# Patient Record
Sex: FEMALE | Race: WHITE | HISPANIC OR LATINO | Employment: STUDENT | URBAN - METROPOLITAN AREA
[De-identification: names, ages, dates, MRNs, and addresses within clinical notes are randomized per-mention and may not be internally consistent; named-entity substitution may affect disease eponyms.]

---

## 2019-02-22 ENCOUNTER — ANESTHESIA EVENT (OUTPATIENT)
Dept: SURGERY | Facility: HOSPITAL | Age: 19
End: 2019-02-22

## 2019-02-22 ENCOUNTER — HOSPITAL ENCOUNTER (OUTPATIENT)
Facility: HOSPITAL | Age: 19
LOS: 1 days | Discharge: HOME | End: 2019-02-24
Attending: EMERGENCY MEDICINE | Admitting: SURGERY

## 2019-02-22 DIAGNOSIS — K35.30 ACUTE APPENDICITIS WITH LOCALIZED PERITONITIS, UNSPECIFIED WHETHER ABSCESS PRESENT, UNSPECIFIED WHETHER GANGRENE PRESENT, UNSPECIFIED WHETHER PERFORATION PRESENT: Primary | ICD-10-CM

## 2019-02-22 LAB
ALBUMIN SERPL BCP-MCNC: 4.2 G/DL
ALP SERPL-CCNC: 51 U/L
ALT SERPL W/O P-5'-P-CCNC: 13 U/L
ANION GAP SERPL CALC-SCNC: 7 MMOL/L
AST SERPL-CCNC: 16 U/L
B-HCG UR QL: NEGATIVE
BASOPHILS # BLD AUTO: 0.02 K/UL
BASOPHILS NFR BLD: 0.1 %
BILIRUB SERPL-MCNC: 1 MG/DL
BILIRUB UR QL STRIP: NEGATIVE
BUN SERPL-MCNC: 6 MG/DL
CALCIUM SERPL-MCNC: 9.6 MG/DL
CHLORIDE SERPL-SCNC: 104 MMOL/L
CLARITY UR: CLEAR
CO2 SERPL-SCNC: 27 MMOL/L
COLOR UR: YELLOW
CREAT SERPL-MCNC: 0.7 MG/DL
CTP QC/QA: YES
DIFFERENTIAL METHOD: ABNORMAL
EOSINOPHIL # BLD AUTO: 0 K/UL
EOSINOPHIL NFR BLD: 0.2 %
ERYTHROCYTE [DISTWIDTH] IN BLOOD BY AUTOMATED COUNT: 12.4 %
EST. GFR  (AFRICAN AMERICAN): >60 ML/MIN/1.73 M^2
EST. GFR  (NON AFRICAN AMERICAN): >60 ML/MIN/1.73 M^2
GLUCOSE SERPL-MCNC: 88 MG/DL
GLUCOSE UR QL STRIP: NEGATIVE
HCT VFR BLD AUTO: 43.2 %
HGB BLD-MCNC: 14.4 G/DL
HGB UR QL STRIP: ABNORMAL
KETONES UR QL STRIP: NEGATIVE
LEUKOCYTE ESTERASE UR QL STRIP: ABNORMAL
LIPASE SERPL-CCNC: 11 U/L
LYMPHOCYTES # BLD AUTO: 2.5 K/UL
LYMPHOCYTES NFR BLD: 14.7 %
MCH RBC QN AUTO: 29.4 PG
MCHC RBC AUTO-ENTMCNC: 33.3 G/DL
MCV RBC AUTO: 88 FL
MICROSCOPIC COMMENT: NORMAL
MONOCYTES # BLD AUTO: 1.2 K/UL
MONOCYTES NFR BLD: 7 %
NEUTROPHILS # BLD AUTO: 13.4 K/UL
NEUTROPHILS NFR BLD: 77.7 %
NITRITE UR QL STRIP: NEGATIVE
PH UR STRIP: 7 [PH] (ref 5–8)
PLATELET # BLD AUTO: 314 K/UL
PMV BLD AUTO: 9.8 FL
POTASSIUM SERPL-SCNC: 3.2 MMOL/L
PROT SERPL-MCNC: 7.3 G/DL
PROT UR QL STRIP: NEGATIVE
RBC # BLD AUTO: 4.9 M/UL
RBC #/AREA URNS HPF: 2 /HPF (ref 0–4)
SODIUM SERPL-SCNC: 138 MMOL/L
SP GR UR STRIP: 1.01 (ref 1–1.03)
URN SPEC COLLECT METH UR: ABNORMAL
UROBILINOGEN UR STRIP-ACNC: NEGATIVE EU/DL
WBC # BLD AUTO: 17.22 K/UL
WBC #/AREA URNS HPF: 1 /HPF (ref 0–5)

## 2019-02-22 PROCEDURE — 25000003 PHARM REV CODE 250: Performed by: STUDENT IN AN ORGANIZED HEALTH CARE EDUCATION/TRAINING PROGRAM

## 2019-02-22 PROCEDURE — 99285 EMERGENCY DEPT VISIT HI MDM: CPT | Mod: 25

## 2019-02-22 PROCEDURE — 25500020 PHARM REV CODE 255: Performed by: EMERGENCY MEDICINE

## 2019-02-22 PROCEDURE — 81000 URINALYSIS NONAUTO W/SCOPE: CPT

## 2019-02-22 PROCEDURE — 63600175 PHARM REV CODE 636 W HCPCS: Performed by: EMERGENCY MEDICINE

## 2019-02-22 PROCEDURE — 96372 THER/PROPH/DIAG INJ SC/IM: CPT | Mod: 59

## 2019-02-22 PROCEDURE — 81025 URINE PREGNANCY TEST: CPT | Performed by: NURSE PRACTITIONER

## 2019-02-22 PROCEDURE — 96375 TX/PRO/DX INJ NEW DRUG ADDON: CPT

## 2019-02-22 PROCEDURE — G0378 HOSPITAL OBSERVATION PER HR: HCPCS

## 2019-02-22 PROCEDURE — 96376 TX/PRO/DX INJ SAME DRUG ADON: CPT

## 2019-02-22 PROCEDURE — 96365 THER/PROPH/DIAG IV INF INIT: CPT

## 2019-02-22 PROCEDURE — 80053 COMPREHEN METABOLIC PANEL: CPT

## 2019-02-22 PROCEDURE — 85025 COMPLETE CBC W/AUTO DIFF WBC: CPT

## 2019-02-22 PROCEDURE — 83690 ASSAY OF LIPASE: CPT

## 2019-02-22 PROCEDURE — 63600175 PHARM REV CODE 636 W HCPCS: Performed by: STUDENT IN AN ORGANIZED HEALTH CARE EDUCATION/TRAINING PROGRAM

## 2019-02-22 RX ORDER — ACETAMINOPHEN 325 MG/1
650 TABLET ORAL EVERY 8 HOURS PRN
Status: DISCONTINUED | OUTPATIENT
Start: 2019-02-22 | End: 2019-02-24 | Stop reason: HOSPADM

## 2019-02-22 RX ORDER — ENOXAPARIN SODIUM 100 MG/ML
40 INJECTION SUBCUTANEOUS EVERY 24 HOURS
Status: DISCONTINUED | OUTPATIENT
Start: 2019-02-22 | End: 2019-02-24 | Stop reason: HOSPADM

## 2019-02-22 RX ORDER — DEXTROSE MONOHYDRATE, SODIUM CHLORIDE, AND POTASSIUM CHLORIDE 50; 1.49; 4.5 G/1000ML; G/1000ML; G/1000ML
INJECTION, SOLUTION INTRAVENOUS CONTINUOUS
Status: DISCONTINUED | OUTPATIENT
Start: 2019-02-22 | End: 2019-02-24 | Stop reason: HOSPADM

## 2019-02-22 RX ORDER — DIPHENHYDRAMINE HYDROCHLORIDE 50 MG/ML
25 INJECTION INTRAMUSCULAR; INTRAVENOUS EVERY 4 HOURS PRN
Status: DISCONTINUED | OUTPATIENT
Start: 2019-02-22 | End: 2019-02-24 | Stop reason: HOSPADM

## 2019-02-22 RX ORDER — KETOROLAC TROMETHAMINE 30 MG/ML
15 INJECTION, SOLUTION INTRAMUSCULAR; INTRAVENOUS
Status: COMPLETED | OUTPATIENT
Start: 2019-02-22 | End: 2019-02-22

## 2019-02-22 RX ORDER — SODIUM CHLORIDE 0.9 % (FLUSH) 0.9 %
3 SYRINGE (ML) INJECTION
Status: DISCONTINUED | OUTPATIENT
Start: 2019-02-22 | End: 2019-02-24 | Stop reason: HOSPADM

## 2019-02-22 RX ORDER — NORETHINDRONE ACETATE AND ETHINYL ESTRADIOL AND FERROUS FUMARATE 5-7-9-7
KIT ORAL DAILY
COMMUNITY
End: 2023-10-24

## 2019-02-22 RX ORDER — ONDANSETRON 8 MG/1
8 TABLET, ORALLY DISINTEGRATING ORAL EVERY 8 HOURS PRN
Status: DISCONTINUED | OUTPATIENT
Start: 2019-02-22 | End: 2019-02-24 | Stop reason: HOSPADM

## 2019-02-22 RX ORDER — ONDANSETRON 2 MG/ML
4 INJECTION INTRAMUSCULAR; INTRAVENOUS
Status: COMPLETED | OUTPATIENT
Start: 2019-02-22 | End: 2019-02-22

## 2019-02-22 RX ORDER — MORPHINE SULFATE 2 MG/ML
3 INJECTION, SOLUTION INTRAMUSCULAR; INTRAVENOUS EVERY 4 HOURS PRN
Status: DISCONTINUED | OUTPATIENT
Start: 2019-02-22 | End: 2019-02-24 | Stop reason: HOSPADM

## 2019-02-22 RX ORDER — LIDOCAINE HYDROCHLORIDE 10 MG/ML
1 INJECTION INFILTRATION; PERINEURAL ONCE
Status: DISCONTINUED | OUTPATIENT
Start: 2019-02-22 | End: 2019-02-24 | Stop reason: HOSPADM

## 2019-02-22 RX ORDER — RAMELTEON 8 MG/1
8 TABLET ORAL NIGHTLY PRN
Status: DISCONTINUED | OUTPATIENT
Start: 2019-02-22 | End: 2019-02-24 | Stop reason: HOSPADM

## 2019-02-22 RX ORDER — METOCLOPRAMIDE HYDROCHLORIDE 5 MG/ML
10 INJECTION INTRAMUSCULAR; INTRAVENOUS
Status: DISCONTINUED | OUTPATIENT
Start: 2019-02-22 | End: 2019-02-22

## 2019-02-22 RX ORDER — POTASSIUM CHLORIDE 20 MEQ/1
40 TABLET, EXTENDED RELEASE ORAL ONCE
Status: COMPLETED | OUTPATIENT
Start: 2019-02-22 | End: 2019-02-22

## 2019-02-22 RX ORDER — NAPROXEN 250 MG/1
250 TABLET ORAL
COMMUNITY
End: 2023-10-24

## 2019-02-22 RX ORDER — METOCLOPRAMIDE HYDROCHLORIDE 5 MG/ML
10 INJECTION INTRAMUSCULAR; INTRAVENOUS
Status: COMPLETED | OUTPATIENT
Start: 2019-02-22 | End: 2019-02-22

## 2019-02-22 RX ORDER — KETOROLAC TROMETHAMINE 30 MG/ML
15 INJECTION, SOLUTION INTRAMUSCULAR; INTRAVENOUS EVERY 6 HOURS
Status: DISCONTINUED | OUTPATIENT
Start: 2019-02-23 | End: 2019-02-24 | Stop reason: HOSPADM

## 2019-02-22 RX ADMIN — KETOROLAC TROMETHAMINE 15 MG: 30 INJECTION, SOLUTION INTRAMUSCULAR at 02:02

## 2019-02-22 RX ADMIN — METOCLOPRAMIDE 10 MG: 5 INJECTION, SOLUTION INTRAMUSCULAR; INTRAVENOUS at 08:02

## 2019-02-22 RX ADMIN — ONDANSETRON 4 MG: 2 INJECTION INTRAMUSCULAR; INTRAVENOUS at 02:02

## 2019-02-22 RX ADMIN — KETOROLAC TROMETHAMINE 15 MG: 30 INJECTION, SOLUTION INTRAMUSCULAR at 11:02

## 2019-02-22 RX ADMIN — POTASSIUM CHLORIDE 40 MEQ: 20 TABLET, EXTENDED RELEASE ORAL at 07:02

## 2019-02-22 RX ADMIN — PIPERACILLIN AND TAZOBACTAM 4.5 G: 4; .5 INJECTION, POWDER, LYOPHILIZED, FOR SOLUTION INTRAVENOUS; PARENTERAL at 07:02

## 2019-02-22 RX ADMIN — DEXTROSE, SODIUM CHLORIDE, AND POTASSIUM CHLORIDE: 5; .45; .15 INJECTION INTRAVENOUS at 11:02

## 2019-02-22 RX ADMIN — ENOXAPARIN SODIUM 40 MG: 100 INJECTION SUBCUTANEOUS at 07:02

## 2019-02-22 RX ADMIN — IOHEXOL 75 ML: 350 INJECTION, SOLUTION INTRAVENOUS at 04:02

## 2019-02-22 NOTE — ED NOTES
Consuelo  number [id number] used to interpret and assess patient at this time. Patient  States she has been having RLQ pain and nausea x2 days. Patient states her Last menstrual period was December 30th; she has had some brown spotting but no cycle. States she took a pregnancy test at home however it was negative. She is also complains on nausea with no vomiting and a headache that feels like pressure.

## 2019-02-22 NOTE — ED PROVIDER NOTES
Encounter Date: 2/22/2019    SCRIBE #1 NOTE: I, Mell Tarango, am scribing for, and in the presence of,  Dr. Lefort. I have scribed the entire note.       History     Chief Complaint   Patient presents with    Abdominal Pain     right abdominal pain with nausea for two days.  No dysuria, no vaginal discharge.  Late with menstrual and took 2 pregnancy tests and they were negative.     This is a 18 y.o. female with no significant PMHx who presents to the Emergency Department with a cc of worsening abdominal pain x 2 days. The pain is located to the lower abdomen, described as stabbing, and constant. Pt reports associated subjective fever, nausea, and vaginal bleeding only after sex. Pt denies vomiting, diarrhea, constipation, frequency, dysuria, or back pain. Her symptoms are worsened with movement and laughing and without alleviating factors. Patient reports a prior history of similar symptoms a couple weeks ago, albeit less severe. She states she saw her PCP a couple weeks ago and the US was normal at that time.         The history is provided by the patient. A  was used.     Review of patient's allergies indicates:  No Known Allergies  History reviewed. No pertinent past medical history.  History reviewed. No pertinent surgical history.  History reviewed. No pertinent family history.  Social History     Tobacco Use    Smoking status: Never Smoker   Substance Use Topics    Alcohol use: No     Frequency: Never    Drug use: No     Review of Systems   Constitutional: Positive for fever (subjective). Negative for chills and fatigue.   HENT: Negative for facial swelling, trouble swallowing and voice change.    Eyes: Negative for photophobia, pain and redness.   Respiratory: Negative for cough, choking and shortness of breath.    Cardiovascular: Negative for chest pain, palpitations and leg swelling.   Gastrointestinal: Positive for abdominal pain and nausea. Negative for constipation, diarrhea  and vomiting.   Genitourinary: Positive for vaginal bleeding. Negative for dysuria, frequency and urgency.   Musculoskeletal: Negative for back pain, neck pain and neck stiffness.   Neurological: Negative for seizures, speech difficulty, light-headedness, numbness and headaches.   All other systems reviewed and are negative.      Physical Exam     Initial Vitals [02/22/19 1322]   BP Pulse Resp Temp SpO2   120/75 89 15 99.1 °F (37.3 °C) 99 %      MAP       --         Physical Exam    Nursing note and vitals reviewed.  Constitutional: She appears well-developed and well-nourished. No distress.   HENT:   Head: Normocephalic and atraumatic.   Mouth/Throat: Oropharynx is clear and moist.   Eyes: Conjunctivae and EOM are normal. Pupils are equal, round, and reactive to light.   Neck: Normal range of motion. Neck supple.   Cardiovascular: Normal rate, regular rhythm and normal heart sounds. Exam reveals no gallop and no friction rub.    No murmur heard.  Pulmonary/Chest: Breath sounds normal. She has no wheezes. She has no rhonchi. She has no rales.   Abdominal: Soft. Bowel sounds are normal. She exhibits no distension. There is tenderness (severe) in the right lower quadrant. There is no guarding.   Musculoskeletal: Normal range of motion. She exhibits no edema or tenderness.   Neurological: She is alert and oriented to person, place, and time. She has normal strength. No cranial nerve deficit.   Skin: Skin is warm and dry. Capillary refill takes less than 2 seconds.   Psychiatric: She has a normal mood and affect. Her behavior is normal.         ED Course   Procedures  Labs Reviewed   URINALYSIS, REFLEX TO URINE CULTURE - Abnormal; Notable for the following components:       Result Value    Occult Blood UA 2+ (*)     Leukocytes, UA Trace (*)     All other components within normal limits    Narrative:     Preferred Collection Type->Urine, Clean Catch   CBC W/ AUTO DIFFERENTIAL - Abnormal; Notable for the following  components:    WBC 17.22 (*)     Gran # (ANC) 13.4 (*)     Mono # 1.2 (*)     Gran% 77.7 (*)     Lymph% 14.7 (*)     All other components within normal limits   COMPREHENSIVE METABOLIC PANEL - Abnormal; Notable for the following components:    Potassium 3.2 (*)     Alkaline Phosphatase 51 (*)     Anion Gap 7 (*)     All other components within normal limits   LIPASE   URINALYSIS MICROSCOPIC    Narrative:     Preferred Collection Type->Urine, Clean Catch   POCT URINE PREGNANCY          Imaging Results          CT Abdomen Pelvis With Contrast (Final result)  Result time 02/22/19 16:18:38    Final result by Darin Cunha DO (02/22/19 16:18:38)                 Impression:      Mild dilated thickened appendix with subtle periappendiceal fat stranding induration with small free fluid in the dependent pelvis concerning for acute appendicitis.  Clinical correlation and surgical consultation advised.    Mild hepatomegaly with slight decreased attenuation of the liver along the falciform ligament suggestive for focal fatty infiltration.    Please see above for additional details.      Electronically signed by: Darin Cunha DO  Date:    02/22/2019  Time:    16:18             Narrative:    EXAMINATION:  CT ABDOMEN PELVIS WITH CONTRAST    CLINICAL HISTORY:  RLQ pain, appendicitis suspected;Recent Pelvic US pt states nl;    TECHNIQUE:  Low dose axial images, sagittal and coronal reformations were obtained from the lung bases to the pubic symphysis following the IV administration of 75 mL of Omnipaque 350 .  Oral contrast was not given.    COMPARISON:  None.    FINDINGS:  No consolidation in the visualized lungs.  The liver is mildly enlarged measuring 18 cm in craniocaudal dimension.  There is slight decreased attenuation in the medial segment left lobe of the liver about the falciform ligament suggestive for focal fatty infiltration.  No calcified gallstones in the gallbladder by CT criteria.  The spleen and pancreas are  normal in size without focal lesions.  The adrenal glands are within normal limits.    Kidneys are normal in size with uptake of contrast without hydronephrosis.  No abdominal aortic aneurysm.    There is mild dilatation of the appendix with subtle periappendiceal fat stranding induration with appendix measuring approximately 1 cm in diameter concerning for early appendicitis.  There is a small amount of free fluid in the pelvis.  No evidence for free intraperitoneal gas.    No focal dilated loop of bowel.  No evidence for acute fracture or subluxation visualized spine                                 Medical Decision Making:   Differential Diagnosis:   Appy torsion cyst AGE ectopic  Clinical Tests:   Lab Tests: Ordered and Reviewed  Radiological Study: Ordered and Reviewed  ED Management:  Imaging concerning for acute appy, pain improved VSS discussed with Dr. Tony [facility] Sx for admission.  Discussed findings with patient including need for surgery to remove inflammed appendix, prevention of rupture and worsening of condition.                   ED Course as of Feb 22 1630 Fri Feb 22, 2019   1324 This is a SORT/MSE of a 18 y.o. female presenting to the ED with c/o lower abdominal pain x2 days with nausea.  Patient states that her menstrual cycle is late and has had 2-pregnancy test at home. Care will be transferred to an alternate provider when patient is roomed for a full evaluation and final disposition. JAMIE Lewis 1:25 PM   [CH]      ED Course User Index  [CH] Ricky Valdez NP     Clinical Impression:     1. Acute appendicitis with localized peritonitis, unspecified whether abscess present, unspecified whether gangrene present, unspecified whether perforation present            Disposition:   Disposition: Admitted  Condition: Stable    Scribe attestation:  I, Dr. Guy Lefort, personally performed the services described in this documentation. All medical record entries made by the scribe were at my  direction and in my presence. I have reviewed the chart and agree that the record reflects my personal performance and is accurate and complete. Guy Lefort, MD.  6:33 PM 02/22/2019                      Guy J. Lefort, MD  02/22/19 8488

## 2019-02-23 ENCOUNTER — ANESTHESIA (OUTPATIENT)
Dept: SURGERY | Facility: HOSPITAL | Age: 19
End: 2019-02-23

## 2019-02-23 LAB
ALBUMIN SERPL BCP-MCNC: 3.3 G/DL
ALP SERPL-CCNC: 40 U/L
ALT SERPL W/O P-5'-P-CCNC: 8 U/L
ANION GAP SERPL CALC-SCNC: 5 MMOL/L
AST SERPL-CCNC: 13 U/L
BASOPHILS # BLD AUTO: 0.02 K/UL
BASOPHILS NFR BLD: 0.3 %
BILIRUB SERPL-MCNC: 0.7 MG/DL
BUN SERPL-MCNC: 9 MG/DL
CALCIUM SERPL-MCNC: 8.8 MG/DL
CHLORIDE SERPL-SCNC: 107 MMOL/L
CO2 SERPL-SCNC: 26 MMOL/L
CREAT SERPL-MCNC: 0.7 MG/DL
DIFFERENTIAL METHOD: NORMAL
EOSINOPHIL # BLD AUTO: 0.1 K/UL
EOSINOPHIL NFR BLD: 2 %
ERYTHROCYTE [DISTWIDTH] IN BLOOD BY AUTOMATED COUNT: 12.5 %
EST. GFR  (AFRICAN AMERICAN): >60 ML/MIN/1.73 M^2
EST. GFR  (NON AFRICAN AMERICAN): >60 ML/MIN/1.73 M^2
GLUCOSE SERPL-MCNC: 113 MG/DL
HCT VFR BLD AUTO: 40 %
HGB BLD-MCNC: 13 G/DL
LYMPHOCYTES # BLD AUTO: 2.6 K/UL
LYMPHOCYTES NFR BLD: 42.5 %
MCH RBC QN AUTO: 28.7 PG
MCHC RBC AUTO-ENTMCNC: 32.5 G/DL
MCV RBC AUTO: 88 FL
MONOCYTES # BLD AUTO: 0.7 K/UL
MONOCYTES NFR BLD: 11.1 %
NEUTROPHILS # BLD AUTO: 2.7 K/UL
NEUTROPHILS NFR BLD: 43.9 %
PLATELET # BLD AUTO: 274 K/UL
PMV BLD AUTO: 9.5 FL
POTASSIUM SERPL-SCNC: 3.8 MMOL/L
PROT SERPL-MCNC: 6.1 G/DL
RBC # BLD AUTO: 4.53 M/UL
SODIUM SERPL-SCNC: 138 MMOL/L
WBC # BLD AUTO: 6.1 K/UL

## 2019-02-23 PROCEDURE — 36000708 HC OR TIME LEV III 1ST 15 MIN: Performed by: SURGERY

## 2019-02-23 PROCEDURE — 71000033 HC RECOVERY, INTIAL HOUR: Performed by: SURGERY

## 2019-02-23 PROCEDURE — 25000003 PHARM REV CODE 250: Performed by: SURGERY

## 2019-02-23 PROCEDURE — C9290 INJ, BUPIVACAINE LIPOSOME: HCPCS | Performed by: SURGERY

## 2019-02-23 PROCEDURE — 88304 TISSUE EXAM BY PATHOLOGIST: CPT | Performed by: PATHOLOGY

## 2019-02-23 PROCEDURE — 63600175 PHARM REV CODE 636 W HCPCS: Performed by: STUDENT IN AN ORGANIZED HEALTH CARE EDUCATION/TRAINING PROGRAM

## 2019-02-23 PROCEDURE — 88304 TISSUE SPECIMEN TO PATHOLOGY - SURGERY: ICD-10-PCS | Mod: 26,,, | Performed by: PATHOLOGY

## 2019-02-23 PROCEDURE — 37000009 HC ANESTHESIA EA ADD 15 MINS: Performed by: SURGERY

## 2019-02-23 PROCEDURE — 94761 N-INVAS EAR/PLS OXIMETRY MLT: CPT

## 2019-02-23 PROCEDURE — 25000003 PHARM REV CODE 250: Performed by: STUDENT IN AN ORGANIZED HEALTH CARE EDUCATION/TRAINING PROGRAM

## 2019-02-23 PROCEDURE — 85025 COMPLETE CBC W/AUTO DIFF WBC: CPT

## 2019-02-23 PROCEDURE — 27201423 OPTIME MED/SURG SUP & DEVICES STERILE SUPPLY: Performed by: SURGERY

## 2019-02-23 PROCEDURE — 37000008 HC ANESTHESIA 1ST 15 MINUTES: Performed by: SURGERY

## 2019-02-23 PROCEDURE — 96361 HYDRATE IV INFUSION ADD-ON: CPT

## 2019-02-23 PROCEDURE — 80053 COMPREHEN METABOLIC PANEL: CPT

## 2019-02-23 PROCEDURE — G0378 HOSPITAL OBSERVATION PER HR: HCPCS

## 2019-02-23 PROCEDURE — 36000709 HC OR TIME LEV III EA ADD 15 MIN: Performed by: SURGERY

## 2019-02-23 PROCEDURE — 63600175 PHARM REV CODE 636 W HCPCS: Performed by: SURGERY

## 2019-02-23 PROCEDURE — 36415 COLL VENOUS BLD VENIPUNCTURE: CPT

## 2019-02-23 PROCEDURE — 88304 TISSUE EXAM BY PATHOLOGIST: CPT | Mod: 26,,, | Performed by: PATHOLOGY

## 2019-02-23 RX ORDER — KETOROLAC TROMETHAMINE 30 MG/ML
INJECTION, SOLUTION INTRAMUSCULAR; INTRAVENOUS
Status: DISCONTINUED | OUTPATIENT
Start: 2019-02-23 | End: 2019-02-23

## 2019-02-23 RX ORDER — BACITRACIN 50000 [IU]/1
INJECTION, POWDER, FOR SOLUTION INTRAMUSCULAR
Status: DISCONTINUED | OUTPATIENT
Start: 2019-02-23 | End: 2019-02-23 | Stop reason: HOSPADM

## 2019-02-23 RX ORDER — NEOSTIGMINE METHYLSULFATE 1 MG/ML
INJECTION, SOLUTION INTRAVENOUS
Status: DISCONTINUED | OUTPATIENT
Start: 2019-02-23 | End: 2019-02-23

## 2019-02-23 RX ORDER — ONDANSETRON 2 MG/ML
INJECTION INTRAMUSCULAR; INTRAVENOUS
Status: DISCONTINUED | OUTPATIENT
Start: 2019-02-23 | End: 2019-02-23

## 2019-02-23 RX ORDER — ROCURONIUM BROMIDE 10 MG/ML
INJECTION, SOLUTION INTRAVENOUS
Status: DISCONTINUED | OUTPATIENT
Start: 2019-02-23 | End: 2019-02-23

## 2019-02-23 RX ORDER — PROPOFOL 10 MG/ML
VIAL (ML) INTRAVENOUS
Status: DISCONTINUED | OUTPATIENT
Start: 2019-02-23 | End: 2019-02-23

## 2019-02-23 RX ORDER — FENTANYL CITRATE 50 UG/ML
INJECTION, SOLUTION INTRAMUSCULAR; INTRAVENOUS
Status: DISCONTINUED | OUTPATIENT
Start: 2019-02-23 | End: 2019-02-23

## 2019-02-23 RX ORDER — MIDAZOLAM HYDROCHLORIDE 1 MG/ML
INJECTION, SOLUTION INTRAMUSCULAR; INTRAVENOUS
Status: DISCONTINUED | OUTPATIENT
Start: 2019-02-23 | End: 2019-02-23

## 2019-02-23 RX ORDER — LIDOCAINE HCL/PF 100 MG/5ML
SYRINGE (ML) INTRAVENOUS
Status: DISCONTINUED | OUTPATIENT
Start: 2019-02-23 | End: 2019-02-23

## 2019-02-23 RX ORDER — SODIUM CHLORIDE, SODIUM LACTATE, POTASSIUM CHLORIDE, CALCIUM CHLORIDE 600; 310; 30; 20 MG/100ML; MG/100ML; MG/100ML; MG/100ML
INJECTION, SOLUTION INTRAVENOUS CONTINUOUS PRN
Status: DISCONTINUED | OUTPATIENT
Start: 2019-02-23 | End: 2019-02-23

## 2019-02-23 RX ORDER — SODIUM CHLORIDE 0.9 % (FLUSH) 0.9 %
3 SYRINGE (ML) INJECTION
Status: DISCONTINUED | OUTPATIENT
Start: 2019-02-23 | End: 2019-02-24 | Stop reason: HOSPADM

## 2019-02-23 RX ORDER — DEXAMETHASONE SODIUM PHOSPHATE 4 MG/ML
INJECTION, SOLUTION INTRA-ARTICULAR; INTRALESIONAL; INTRAMUSCULAR; INTRAVENOUS; SOFT TISSUE
Status: DISCONTINUED | OUTPATIENT
Start: 2019-02-23 | End: 2019-02-23

## 2019-02-23 RX ORDER — EPHEDRINE SULFATE 50 MG/ML
INJECTION, SOLUTION INTRAVENOUS
Status: DISCONTINUED | OUTPATIENT
Start: 2019-02-23 | End: 2019-02-23

## 2019-02-23 RX ORDER — ONDANSETRON 2 MG/ML
4 INJECTION INTRAMUSCULAR; INTRAVENOUS DAILY PRN
Status: DISCONTINUED | OUTPATIENT
Start: 2019-02-23 | End: 2019-02-24 | Stop reason: HOSPADM

## 2019-02-23 RX ORDER — GLYCOPYRROLATE 0.2 MG/ML
INJECTION INTRAMUSCULAR; INTRAVENOUS
Status: DISCONTINUED | OUTPATIENT
Start: 2019-02-23 | End: 2019-02-23

## 2019-02-23 RX ORDER — ACETAMINOPHEN 10 MG/ML
INJECTION, SOLUTION INTRAVENOUS
Status: DISCONTINUED | OUTPATIENT
Start: 2019-02-23 | End: 2019-02-23

## 2019-02-23 RX ORDER — HYDROMORPHONE HYDROCHLORIDE 2 MG/ML
0.5 INJECTION, SOLUTION INTRAMUSCULAR; INTRAVENOUS; SUBCUTANEOUS EVERY 5 MIN PRN
Status: DISCONTINUED | OUTPATIENT
Start: 2019-02-23 | End: 2019-02-24 | Stop reason: HOSPADM

## 2019-02-23 RX ORDER — BUPIVACAINE HYDROCHLORIDE 2.5 MG/ML
INJECTION, SOLUTION EPIDURAL; INFILTRATION; INTRACAUDAL
Status: DISCONTINUED | OUTPATIENT
Start: 2019-02-23 | End: 2019-02-23 | Stop reason: HOSPADM

## 2019-02-23 RX ORDER — SUCCINYLCHOLINE CHLORIDE 20 MG/ML
INJECTION INTRAMUSCULAR; INTRAVENOUS
Status: DISCONTINUED | OUTPATIENT
Start: 2019-02-23 | End: 2019-02-23

## 2019-02-23 RX ADMIN — KETOROLAC TROMETHAMINE 30 MG: 30 INJECTION, SOLUTION INTRAMUSCULAR; INTRAVENOUS at 01:02

## 2019-02-23 RX ADMIN — FENTANYL CITRATE 50 MCG: 50 INJECTION, SOLUTION INTRAMUSCULAR; INTRAVENOUS at 12:02

## 2019-02-23 RX ADMIN — PIPERACILLIN AND TAZOBACTAM 4.5 G: 4; .5 INJECTION, POWDER, LYOPHILIZED, FOR SOLUTION INTRAVENOUS; PARENTERAL at 03:02

## 2019-02-23 RX ADMIN — EPHEDRINE SULFATE 10 MG: 50 INJECTION, SOLUTION INTRAMUSCULAR; INTRAVENOUS; SUBCUTANEOUS at 12:02

## 2019-02-23 RX ADMIN — MIDAZOLAM 2 MG: 1 INJECTION INTRAMUSCULAR; INTRAVENOUS at 12:02

## 2019-02-23 RX ADMIN — FENTANYL CITRATE 100 MCG: 50 INJECTION, SOLUTION INTRAMUSCULAR; INTRAVENOUS at 12:02

## 2019-02-23 RX ADMIN — ONDANSETRON 8 MG: 8 TABLET, ORALLY DISINTEGRATING ORAL at 05:02

## 2019-02-23 RX ADMIN — FENTANYL CITRATE 50 MCG: 50 INJECTION, SOLUTION INTRAMUSCULAR; INTRAVENOUS at 01:02

## 2019-02-23 RX ADMIN — HYDROMORPHONE HYDROCHLORIDE 0.5 MG: 2 INJECTION, SOLUTION INTRAMUSCULAR; INTRAVENOUS; SUBCUTANEOUS at 02:02

## 2019-02-23 RX ADMIN — DEXTROSE, SODIUM CHLORIDE, AND POTASSIUM CHLORIDE: 5; .45; .15 INJECTION INTRAVENOUS at 09:02

## 2019-02-23 RX ADMIN — EPHEDRINE SULFATE 10 MG: 50 INJECTION, SOLUTION INTRAMUSCULAR; INTRAVENOUS; SUBCUTANEOUS at 01:02

## 2019-02-23 RX ADMIN — PIPERACILLIN SODIUM AND TAZOBACTAM SODIUM 4.5 G: 2; .25 INJECTION, POWDER, LYOPHILIZED, FOR SOLUTION INTRAVENOUS at 01:02

## 2019-02-23 RX ADMIN — MORPHINE SULFATE 3 MG: 2 INJECTION, SOLUTION INTRAMUSCULAR; INTRAVENOUS at 08:02

## 2019-02-23 RX ADMIN — ROCURONIUM BROMIDE 5 MG: 10 INJECTION, SOLUTION INTRAVENOUS at 12:02

## 2019-02-23 RX ADMIN — PIPERACILLIN AND TAZOBACTAM 4.5 G: 4; .5 INJECTION, POWDER, LYOPHILIZED, FOR SOLUTION INTRAVENOUS; PARENTERAL at 08:02

## 2019-02-23 RX ADMIN — SUCCINYLCHOLINE CHLORIDE 100 MG: 20 INJECTION, SOLUTION INTRAMUSCULAR; INTRAVENOUS at 12:02

## 2019-02-23 RX ADMIN — MIDAZOLAM 1 MG: 1 INJECTION INTRAMUSCULAR; INTRAVENOUS at 01:02

## 2019-02-23 RX ADMIN — ROCURONIUM BROMIDE 10 MG: 10 INJECTION, SOLUTION INTRAVENOUS at 01:02

## 2019-02-23 RX ADMIN — PROPOFOL 150 MG: 10 INJECTION, EMULSION INTRAVENOUS at 12:02

## 2019-02-23 RX ADMIN — LIDOCAINE HYDROCHLORIDE 75 MG: 20 INJECTION, SOLUTION INTRAVENOUS at 12:02

## 2019-02-23 RX ADMIN — PIPERACILLIN AND TAZOBACTAM 4.5 G: 4; .5 INJECTION, POWDER, LYOPHILIZED, FOR SOLUTION INTRAVENOUS; PARENTERAL at 11:02

## 2019-02-23 RX ADMIN — ROCURONIUM BROMIDE 15 MG: 10 INJECTION, SOLUTION INTRAVENOUS at 12:02

## 2019-02-23 RX ADMIN — ONDANSETRON 4 MG: 2 INJECTION, SOLUTION INTRAMUSCULAR; INTRAVENOUS at 01:02

## 2019-02-23 RX ADMIN — DEXAMETHASONE SODIUM PHOSPHATE 8 MG: 4 INJECTION, SOLUTION INTRAMUSCULAR; INTRAVENOUS at 12:02

## 2019-02-23 RX ADMIN — NEOSTIGMINE METHYLSULFATE 3 MG: 1 INJECTION INTRAVENOUS at 01:02

## 2019-02-23 RX ADMIN — SODIUM CHLORIDE, SODIUM LACTATE, POTASSIUM CHLORIDE, AND CALCIUM CHLORIDE: .6; .31; .03; .02 INJECTION, SOLUTION INTRAVENOUS at 12:02

## 2019-02-23 RX ADMIN — ACETAMINOPHEN 1000 MG: 10 INJECTION, SOLUTION INTRAVENOUS at 12:02

## 2019-02-23 RX ADMIN — GLYCOPYRROLATE 0.5 MG: 0.2 INJECTION, SOLUTION INTRAMUSCULAR; INTRAVENOUS at 01:02

## 2019-02-23 NOTE — PROGRESS NOTES
"General Surgery Service  H&P    Chief Complaint: RLQ Abdominal pain    HPI: Patient is a 18 year old female with 2 days of abdominal pain. She started having nausea but no vomiting. Patient states she had a fever last night but otherwise felt okay. She denies any chills.   Complains of abdominal pain that localized to RLQ that has become progressively more localized to the RLQ. No history of previous surgery and no other medical conditions.     Interval Hx: Pain improved but still focally tender to RLQ. NAEO    Vitals:  Vitals:    02/22/19 2050 02/22/19 2345 02/23/19 0011 02/23/19 0534   BP: 97/63  107/65 96/60   BP Location: Left arm  Left arm Left arm   Patient Position: Lying  Lying Lying   Pulse: 91  85 77   Resp: 16  14 16   Temp: 98.5 °F (36.9 °C)  98.4 °F (36.9 °C) 98.8 °F (37.1 °C)   TempSrc: Oral  Oral Oral   SpO2: 97% 100% 100% 100%   Weight: 57.6 kg (126 lb 15.8 oz)   56.2 kg (123 lb 14.4 oz)   Height: 5' 2" (1.575 m)        Body mass index is 22.66 kg/m².I/O last 3 completed shifts:  In: 803.3 [I.V.:603.3; IV Piggyback:200]  Out: 150 [Urine:150]  I/O this shift:  In: -   Out: 300 [Urine:300]      Intake/Output Summary (Last 24 hours) at 2/23/2019 0847  Last data filed at 2/23/2019 0800  Gross per 24 hour   Intake 803.33 ml   Output 450 ml   Net 353.33 ml       Physical Exam:  Gen - No acute distress, awake/alert/oriented  HEENT - Normocephalic, atraumatic, No NGT  CV - Regular rate and rhythm  Resp - normal work of breathing  Abd - Soft, TTP in RLQ improved, not distended, no surgical scars, negative rebound tenderness, no guarding  Ext - Warm and well perfused  G/U - No sheth catheter in place  Vasc - palpable distal pulses BUE      Labs:    Recent Labs   Lab 02/23/19  0528   WBC 6.10   RBC 4.53   HGB 13.0   HCT 40.0      MCV 88   MCH 28.7   MCHC 32.5     Recent Labs   Lab 02/23/19 0528      K 3.8      CO2 26   BUN 9   CREATININE 0.7     Recent Labs   Lab 02/23/19 0528   CALCIUM 8.8 "   PROT 6.1      K 3.8   CO2 26      BUN 9   CREATININE 0.7   ALKPHOS 40*   ALT 8*   AST 13   BILITOT 0.7       Radiology:  Imaging Results          CT Abdomen Pelvis With Contrast (Final result)  Result time 02/22/19 16:18:38    Final result by Darin Cunha DO (02/22/19 16:18:38)                 Impression:      Mild dilated thickened appendix with subtle periappendiceal fat stranding induration with small free fluid in the dependent pelvis concerning for acute appendicitis.  Clinical correlation and surgical consultation advised.    Mild hepatomegaly with slight decreased attenuation of the liver along the falciform ligament suggestive for focal fatty infiltration.    Please see above for additional details.      Electronically signed by: Darin Cunha DO  Date:    02/22/2019  Time:    16:18             Narrative:    EXAMINATION:  CT ABDOMEN PELVIS WITH CONTRAST    CLINICAL HISTORY:  RLQ pain, appendicitis suspected;Recent Pelvic US pt states nl;    TECHNIQUE:  Low dose axial images, sagittal and coronal reformations were obtained from the lung bases to the pubic symphysis following the IV administration of 75 mL of Omnipaque 350 .  Oral contrast was not given.    COMPARISON:  None.    FINDINGS:  No consolidation in the visualized lungs.  The liver is mildly enlarged measuring 18 cm in craniocaudal dimension.  There is slight decreased attenuation in the medial segment left lobe of the liver about the falciform ligament suggestive for focal fatty infiltration.  No calcified gallstones in the gallbladder by CT criteria.  The spleen and pancreas are normal in size without focal lesions.  The adrenal glands are within normal limits.    Kidneys are normal in size with uptake of contrast without hydronephrosis.  No abdominal aortic aneurysm.    There is mild dilatation of the appendix with subtle periappendiceal fat stranding induration with appendix measuring approximately 1 cm in diameter concerning  for early appendicitis.  There is a small amount of free fluid in the pelvis.  No evidence for free intraperitoneal gas.    No focal dilated loop of bowel.  No evidence for acute fracture or subluxation visualized spine                                  Micro:  Microbiology Results (last 7 days)     ** No results found for the last 168 hours. **          Assessment and Plan: 18 year old female with acute appendicitis    To the OR for lap appy    Dispo: continue inpatient care, surgery today

## 2019-02-23 NOTE — OP NOTE
[facility]  Appendectomy, Laparoscopic  Procedure Note    SUMMARY     Date of Procedure: 2/23/2019    Procedure: Procedure(s) (LRB):  APPENDECTOMY, LAPAROSCOPIC (N/A)    Surgeon(s) and Role:     * ABBY Briceno MD - Primary    Assisting Surgeon:AURELIA    Indications: The patient presented with a history of right-sided abdominal pain. A CT revealed findings consistent with acute appendicitis.    Pre-Operative Diagnosis: Same    Post-Operative Diagnosis: Acute appendicitis without mention of peritonitis    Anesthesia: General    Technical Procedures Used:  LAPAROSCOPIc carrasco.  Description of the Findings of the Procedure:    The patient was seen again in the Holding Room. The risks, benefits, complications, treatment options, and expected outcomes were discussed with the patient and/or family. The possibilities of reaction to medication, pulmonary aspiration, perforation of viscus, bleeding, recurrent infection, finding a normal appendix, the need for additional procedures, failure to diagnose a condition, and creating a complication requiring transfusion or operation were discussed. There was concurrence with the proposed plan and informed consent was obtained. The site of surgery was properly noted/marked. The patient was taken to Operating Room, identified as Jonna Purcell and the procedure verified as Appendectomy. A Time Out was held and the above information confirmed.    The patient was placed in the supine position and general anesthesia was induced, along with placement of orogastric tube, Venodyne boots, and a Ley catheter. The abdomen was prepped and draped in a sterile fashion. A one centimeter infraumbilical incision was made and the peritoneal cavity was accessed using the Veress needle technique. The pneumoperitoneum was then established to steady pressure of 12 mmHg. A 12 mm Versaport was placed through the umbilical incision. Additional 5 mm cannulas then placed in  the left lower quadrant of the abdomen and half way between the umbilicus and pubic symphysis under direct vision. A careful evaluation of the entire abdomen was carried out. The patient was placed in Trendelenburg and left lateral decubitus position. The small intestines were retracted in the cephalad and left lateral direction away from the pelvis and right lower quadrant.   The patient was found to have an enlarged and inflamed appendix that was extending into the pelvis. There was no evidence of perforation.    The appendix was carefully dissected. A window was made in the mesoappendix at the base of the appendix. A vascular load endo-TEJA was fired across the mesoappendix. The appendix was divided at its base using an endo-TEJA stapler. Minimal appendiceal stump was left in place. There was no evidence of bleeding, leakage, or complication after division of the appendix. Irrigation was also performed and irrigate suctioned from the abdomen as well.    The umbilical port site was closed using Polysorb sutures in a figure eight fashion at the level of the fascia. The trocar site skin wounds were closed using skin staples.    Instrument, sponge, and needle counts were correct at the conclusion of the case.     The appendix was found to be inflamed. There were not signs of necrosis.  There was not perforation. There was not abscess formation.         Complications: None; patient tolerated the procedure well.             Estimated Blood Loss (EBL): Minimal    Drains: 0    Implants: 0    Specimens:  Appendix           Condition: stable    Disposition: PACU - hemodynamically stable.    Attestation: I was present and scrubbed for the entire procedure.

## 2019-02-23 NOTE — PLAN OF CARE
Signed out from recovery phase per Dr Galarza. Report given to Ailyn Cao/5A. Resting in bed, denies any discomfort at present,sr upx2. Father at bedside.

## 2019-02-23 NOTE — PLAN OF CARE
Problem: Adult Inpatient Plan of Care  Goal: Plan of Care Review  Outcome: Ongoing (interventions implemented as appropriate)  Patient aaox4, safety measures implemented call light in reach, bed in lowest position, pain controlled with ordered pain medication, patient up to void using urinal, will continue to monitor.

## 2019-02-23 NOTE — TRANSFER OF CARE
"Anesthesia Transfer of Care Note    Patient: Jonna Purcell    Procedure(s) Performed: Procedure(s) (LRB):  APPENDECTOMY, LAPAROSCOPIC (N/A)    Patient location: [facility]    Anesthesia Type: general    Transport from OR: Transported from OR on 6-10 L/min O2 by face mask with adequate spontaneous ventilation    Post pain: adequate analgesia    Post assessment: no apparent anesthetic complications    Post vital signs: stable    Level of consciousness: awake and alert    Nausea/Vomiting: no nausea/vomiting    Complications: none          Last vitals:   Visit Vitals  BP (!) 95/54 (BP Location: Right arm, Patient Position: Lying)   Pulse 78   Temp 36.8 °C (98.3 °F) (Oral)   Resp 20   Ht 5' 2" (1.575 m)   Wt 56.2 kg (123 lb 14.4 oz)   LMP 12/30/2018 (Approximate)   SpO2 99%   Breastfeeding? No   BMI 22.66 kg/m²     "

## 2019-02-23 NOTE — PLAN OF CARE
Received in room 531 via stretcher sr upx2 from or acompained by Dr Mclaughlin & Juma Rn-or ; transferred to her bed,sr upx2,monitors upx3. Recovery phase started.

## 2019-02-23 NOTE — PLAN OF CARE
Cued into patient's room.  Permission received per patient to turn camera to view patient.  Introduced as VN for night shift that will be working with floor nurse and nursing assistant.  Family members at bedside. Educated patient on VN's role in patient care. Plan of care reviewed with patient. Education per flowsheet.  Opportunity given for questions and questions answered.  Admission assessment questions answered.  Requests  for education.  Instructed to call for assistance.  Will cont to monitor.

## 2019-02-23 NOTE — PLAN OF CARE
Progress notes reviewed. Pt recently arrived from  Post op recovery. Rounds completed. Introduced self as VN for this shift to patient and family in [place].   Educated pt on VN's role in patient's care.  Plan of care reviewed with patient. Opportunity given for pt's questions. No questions or concerns expressed at this time. Safety precautions explained, instructed to call for assistance. Patient verbalizes understanding.  VN to continue to monitor.         02/23/19 1500   Type of Frequent Check   Type Patient Rounds   Safety/Activity   Patient Rounds bed in low position;bed wheels locked;call light in patient/parent reach;clutter free environment maintained;ID band on;visualized patient   Safety Promotion/Fall Prevention Fall Risk reviewed with patient/family;instructed to call staff for mobility   Positioning   Body Position positioned/repositioned independently   Pain/Comfort/Sleep   Preferred Pain Scale number (Numeric Rating Pain Scale)   Pain Rating (0-10): Rest 0   Headache   Headache No   Nausea/Vomiting   Nausea/Vomiting No Nausea and Vomiting   Assessments (Pre/Post)   Level of Consciousness (AVPU) alert

## 2019-02-23 NOTE — ED NOTES
Surgery to be done in the morning patient to be NPO at midnight. Patient given meal tray at this time.

## 2019-02-23 NOTE — ANESTHESIA PREPROCEDURE EVALUATION
02/22/2019  Jonna Purcell is a 18 y.o., female with no significant PMH here for laparoscopic appendectomy.    Anesthesia Evaluation    I have reviewed the Patient Summary Reports.    I have reviewed the Nursing Notes.   I have reviewed the Medications.     Review of Systems  Anesthesia Hx:  No previous Anesthesia  Denies Family Hx of Anesthesia complications.    Social:  Non-Smoker, No Alcohol Use    Hematology/Oncology:  Hematology Normal   Oncology Normal     EENT/Dental:EENT/Dental Normal   Cardiovascular:  Cardiovascular Normal Exercise tolerance: good     Pulmonary:  Pulmonary Normal    Renal/:  Renal/ Normal     Hepatic/GI:  Hepatic/GI Normal    Musculoskeletal:  Musculoskeletal Normal    Neurological:  Neurology Normal    Endocrine:  Endocrine Normal    Dermatological:  Skin Normal    Psych:  Psychiatric Normal           Physical Exam  General:  Well nourished    Airway/Jaw/Neck:  Airway Findings: Mouth Opening: Small, but > 3cm Mallampati: II  TM Distance: Normal, at least 6 cm  Jaw/Neck Findings:  Limited Ability to Prognath  Neck ROM: Normal ROM      Dental:  Dental Findings: In tact   Chest/Lungs:  Chest/Lungs Findings: Clear to auscultation     Heart/Vascular:  Heart Findings: Rate: Normal  Rhythm: Regular Rhythm        Mental Status:  Mental Status Findings:  Cooperative, Alert and Oriented       Lab Results   Component Value Date    WBC 6.10 02/23/2019    HGB 13.0 02/23/2019    HCT 40.0 02/23/2019     02/23/2019    ALT 8 (L) 02/23/2019    AST 13 02/23/2019     02/23/2019    K 3.8 02/23/2019     02/23/2019    CREATININE 0.7 02/23/2019    BUN 9 02/23/2019    CO2 26 02/23/2019         Anesthesia Plan  Type of Anesthesia, risks & benefits discussed:  Anesthesia Type:  general  Patient's Preference:   Intra-op Monitoring Plan: standard ASA monitors  Intra-op Monitoring  Plan Comments:   Post Op Pain Control Plan: multimodal analgesia  Post Op Pain Control Plan Comments:   Induction:   IV  Beta Blocker:  Patient is not currently on a Beta-Blocker (No further documentation required).       Informed Consent: Patient understands risks and agrees with Anesthesia plan.  Questions answered. Anesthesia consent signed with patient.  ASA Score: 1     Day of Surgery Review of History & Physical:            Ready For Surgery From Anesthesia Perspective.

## 2019-02-23 NOTE — H&P
General Surgery Service  H&P    Chief Complaint: RLQ Abdominal pain    HPI: Patient is a 18 year old female with 2 days of abdominal pain. She started having nausea but no vomiting. Patient states she had a fever last night but otherwise felt okay. She denies any chills.   Complains of abdominal pain that localized to RLQ that has become progressively more localized to the RLQ. No history of previous surgery and no other medical conditions.     PMH:  History reviewed. No pertinent past medical history.      PSH:  History reviewed. No pertinent surgical history.      FH:  History reviewed. No pertinent family history.      Social:  Social History     Socioeconomic History    Marital status: Single     Spouse name: Not on file    Number of children: Not on file    Years of education: Not on file    Highest education level: Not on file   Social Needs    Financial resource strain: Not on file    Food insecurity - worry: Not on file    Food insecurity - inability: Not on file    Transportation needs - medical: Not on file    Transportation needs - non-medical: Not on file   Occupational History    Not on file   Tobacco Use    Smoking status: Never Smoker   Substance and Sexual Activity    Alcohol use: No     Frequency: Never    Drug use: No    Sexual activity: Not on file   Other Topics Concern    Not on file   Social History Narrative    Not on file         Allergies:  Review of patient's allergies indicates:  No Known Allergies      Meds:  No current facility-administered medications on file prior to encounter.      Current Outpatient Medications on File Prior to Encounter   Medication Sig Dispense Refill    norethindrone-ethinyl estradiol-iron (ESTROSTEP FE) 1-20(5)/1-30(7) /1mg-35mcg (9) Tab Take by mouth once daily.           ROS: Negative except as noted in HPI      Vitals:  Vitals:    02/22/19 1322 02/22/19 1554 02/22/19 1717   BP: 120/75 99/62 107/61   BP Location: Left arm Right arm Right arm  "  Patient Position: Sitting Sitting Sitting   Pulse: 89 80 83   Resp: 15 14    Temp: 99.1 °F (37.3 °C) 98.4 °F (36.9 °C) 98.4 °F (36.9 °C)   TempSrc: Oral Oral Oral   SpO2: 99% 99% 100%   Weight: 58.5 kg (129 lb)     Height: 5' 2" (1.575 m)       Body mass index is 23.59 kg/m².No intake/output data recorded.  No intake/output data recorded.    No intake or output data in the 24 hours ending 02/22/19 1809    Physical Exam:  Gen - No acute distress, awake/alert/oriented  HEENT - Normocephalic, atraumatic, No NGT  CV - Regular rate and rhythm  Resp - normal work of breathing  Abd - Soft, TTP in RLQ, not distended, no surgical scars, negative rebound tenderness, no guarding  Ext - Warm and well perfused  G/U - No sheth catheter in place  Vasc - palpable distal pulses BUE      Labs:    Recent Labs   Lab 02/22/19  1433   WBC 17.22*   RBC 4.90   HGB 14.4   HCT 43.2      MCV 88   MCH 29.4   MCHC 33.3     Recent Labs   Lab 02/22/19  1433      K 3.2*      CO2 27   BUN 6   CREATININE 0.7     Recent Labs   Lab 02/22/19  1433   CALCIUM 9.6   PROT 7.3      K 3.2*   CO2 27      BUN 6   CREATININE 0.7   ALKPHOS 51*   ALT 13   AST 16   BILITOT 1.0       Radiology:  Imaging Results          CT Abdomen Pelvis With Contrast (Final result)  Result time 02/22/19 16:18:38    Final result by Darin Cunha DO (02/22/19 16:18:38)                 Impression:      Mild dilated thickened appendix with subtle periappendiceal fat stranding induration with small free fluid in the dependent pelvis concerning for acute appendicitis.  Clinical correlation and surgical consultation advised.    Mild hepatomegaly with slight decreased attenuation of the liver along the falciform ligament suggestive for focal fatty infiltration.    Please see above for additional details.      Electronically signed by: Darin Cunha DO  Date:    02/22/2019  Time:    16:18             Narrative:    EXAMINATION:  CT ABDOMEN PELVIS WITH " CONTRAST    CLINICAL HISTORY:  RLQ pain, appendicitis suspected;Recent Pelvic US pt states nl;    TECHNIQUE:  Low dose axial images, sagittal and coronal reformations were obtained from the lung bases to the pubic symphysis following the IV administration of 75 mL of Omnipaque 350 .  Oral contrast was not given.    COMPARISON:  None.    FINDINGS:  No consolidation in the visualized lungs.  The liver is mildly enlarged measuring 18 cm in craniocaudal dimension.  There is slight decreased attenuation in the medial segment left lobe of the liver about the falciform ligament suggestive for focal fatty infiltration.  No calcified gallstones in the gallbladder by CT criteria.  The spleen and pancreas are normal in size without focal lesions.  The adrenal glands are within normal limits.    Kidneys are normal in size with uptake of contrast without hydronephrosis.  No abdominal aortic aneurysm.    There is mild dilatation of the appendix with subtle periappendiceal fat stranding induration with appendix measuring approximately 1 cm in diameter concerning for early appendicitis.  There is a small amount of free fluid in the pelvis.  No evidence for free intraperitoneal gas.    No focal dilated loop of bowel.  No evidence for acute fracture or subluxation visualized spine                                  Micro:  Microbiology Results (last 7 days)     ** No results found for the last 168 hours. **          Assessment and Plan: 18 year old female with acute appendicitis    Admit to surgical service  Regular diet now and NPO at midnight  Oral pain meds with IV for breakthrough  MIVF at midnight  IV antibiotics  To the OR tomorrow for lap appy  Consents obtained and placed on the chart    PPX: DVT lovenox 40 q D         Dispo: continue inpatient care, surgery tomorrow

## 2019-02-24 VITALS
RESPIRATION RATE: 20 BRPM | HEIGHT: 62 IN | WEIGHT: 132.5 LBS | SYSTOLIC BLOOD PRESSURE: 94 MMHG | TEMPERATURE: 98 F | OXYGEN SATURATION: 100 % | BODY MASS INDEX: 24.38 KG/M2 | HEART RATE: 66 BPM | DIASTOLIC BLOOD PRESSURE: 53 MMHG

## 2019-02-24 PROBLEM — K35.30 ACUTE APPENDICITIS WITH LOCALIZED PERITONITIS: Status: RESOLVED | Noted: 2019-02-22 | Resolved: 2019-02-24

## 2019-02-24 LAB
ALBUMIN SERPL BCP-MCNC: 3.2 G/DL
ALP SERPL-CCNC: 35 U/L
ALT SERPL W/O P-5'-P-CCNC: 10 U/L
ANION GAP SERPL CALC-SCNC: 3 MMOL/L
AST SERPL-CCNC: 14 U/L
BASOPHILS # BLD AUTO: 0.02 K/UL
BASOPHILS NFR BLD: 0.2 %
BILIRUB SERPL-MCNC: 0.6 MG/DL
BUN SERPL-MCNC: 3 MG/DL
CALCIUM SERPL-MCNC: 9 MG/DL
CHLORIDE SERPL-SCNC: 108 MMOL/L
CO2 SERPL-SCNC: 28 MMOL/L
CREAT SERPL-MCNC: 0.7 MG/DL
DIFFERENTIAL METHOD: ABNORMAL
EOSINOPHIL # BLD AUTO: 0.1 K/UL
EOSINOPHIL NFR BLD: 0.5 %
ERYTHROCYTE [DISTWIDTH] IN BLOOD BY AUTOMATED COUNT: 12.3 %
EST. GFR  (AFRICAN AMERICAN): >60 ML/MIN/1.73 M^2
EST. GFR  (NON AFRICAN AMERICAN): >60 ML/MIN/1.73 M^2
GLUCOSE SERPL-MCNC: 130 MG/DL
HCT VFR BLD AUTO: 37.4 %
HGB BLD-MCNC: 12.3 G/DL
LYMPHOCYTES # BLD AUTO: 2 K/UL
LYMPHOCYTES NFR BLD: 18.1 %
MCH RBC QN AUTO: 29.1 PG
MCHC RBC AUTO-ENTMCNC: 32.9 G/DL
MCV RBC AUTO: 89 FL
MONOCYTES # BLD AUTO: 1 K/UL
MONOCYTES NFR BLD: 8.9 %
NEUTROPHILS # BLD AUTO: 8 K/UL
NEUTROPHILS NFR BLD: 72.1 %
PLATELET # BLD AUTO: 270 K/UL
PMV BLD AUTO: 9.5 FL
POTASSIUM SERPL-SCNC: 4.1 MMOL/L
PROT SERPL-MCNC: 6 G/DL
RBC # BLD AUTO: 4.22 M/UL
SODIUM SERPL-SCNC: 139 MMOL/L
WBC # BLD AUTO: 11.04 K/UL

## 2019-02-24 PROCEDURE — 80053 COMPREHEN METABOLIC PANEL: CPT

## 2019-02-24 PROCEDURE — 94761 N-INVAS EAR/PLS OXIMETRY MLT: CPT

## 2019-02-24 PROCEDURE — 85025 COMPLETE CBC W/AUTO DIFF WBC: CPT

## 2019-02-24 PROCEDURE — 36415 COLL VENOUS BLD VENIPUNCTURE: CPT

## 2019-02-24 PROCEDURE — 63600175 PHARM REV CODE 636 W HCPCS: Performed by: STUDENT IN AN ORGANIZED HEALTH CARE EDUCATION/TRAINING PROGRAM

## 2019-02-24 PROCEDURE — G0378 HOSPITAL OBSERVATION PER HR: HCPCS

## 2019-02-24 RX ORDER — TRAMADOL HYDROCHLORIDE 50 MG/1
50 TABLET ORAL EVERY 4 HOURS PRN
Qty: 21 TABLET | Refills: 0 | Status: SHIPPED | OUTPATIENT
Start: 2019-02-24 | End: 2023-10-24

## 2019-02-24 RX ORDER — TRAMADOL HYDROCHLORIDE 50 MG/1
50 TABLET ORAL EVERY 4 HOURS PRN
Qty: 24 TABLET | Refills: 0 | Status: SHIPPED | OUTPATIENT
Start: 2019-02-24 | End: 2019-02-24 | Stop reason: SDUPTHER

## 2019-02-24 RX ADMIN — MORPHINE SULFATE 3 MG: 2 INJECTION, SOLUTION INTRAMUSCULAR; INTRAVENOUS at 04:02

## 2019-02-24 NOTE — PLAN OF CARE
Problem: Adult Inpatient Plan of Care  Goal: Plan of Care Review  Outcome: Ongoing (interventions implemented as appropriate)  Pt is aaox4. Patient safety maintained. Pain controlled with morphine. Patient sleep and rested over the night. Family at bedside over night. No N&V,  No diarrhea. No distress noted. Will continue to monitor.

## 2019-02-24 NOTE — PROGRESS NOTES
General Surgery Service  H&P    Chief Complaint: RLQ Abdominal pain    HPI: Patient is a 18 year old female with 2 days of abdominal pain. She started having nausea but no vomiting. Patient states she had a fever last night but otherwise felt okay. She denies any chills.   Complains of abdominal pain that localized to RLQ that has become progressively more localized to the RLQ. No history of previous surgery and no other medical conditions.     Interval Hx: Patient s/p lap appy with no issues. Pain controlled. Tolerating regular diet.    Vitals:  Vitals:    02/24/19 0414 02/24/19 0434 02/24/19 0803 02/24/19 0857   BP: (!) 94/53  (!) 94/53    BP Location: Left arm  Right arm    Patient Position: Lying  Lying    Pulse: 72  66    Resp: 18  20    Temp: 98 °F (36.7 °C)  97.8 °F (36.6 °C)    TempSrc: Oral  Oral    SpO2:  99%  100%   Weight: 60.1 kg (132 lb 7.9 oz)      Height:         Body mass index is 24.23 kg/m².I/O last 3 completed shifts:  In: 2903.3 [P.O.:1000; I.V.:1603.3; IV Piggyback:300]  Out: 3150 [Urine:3150]  I/O this shift:  In: 300 [P.O.:300]  Out: 400 [Urine:400]      Intake/Output Summary (Last 24 hours) at 2/24/2019 1027  Last data filed at 2/24/2019 0907  Gross per 24 hour   Intake 2400 ml   Output 3100 ml   Net -700 ml       Physical Exam:  Gen - No acute distress, awake/alert/oriented  HEENT - Normocephalic, atraumatic, No NGT  CV - Regular rate and rhythm  Resp - normal work of breathing  Abd - Soft, TTP in RLQ improved, not distended, surgical scars c/d/i , negative rebound tenderness, no guarding  Ext - Warm and well perfused  G/U - No sheth catheter in place  Vasc - palpable distal pulses BUE      Labs:    Recent Labs   Lab 02/24/19  0716   WBC 11.04   RBC 4.22   HGB 12.3   HCT 37.4      MCV 89   MCH 29.1   MCHC 32.9     Recent Labs   Lab 02/24/19  0716      K 4.1      CO2 28   BUN 3*   CREATININE 0.7     Recent Labs   Lab 02/24/19  0716   CALCIUM 9.0   PROT 6.0      K 4.1    CO2 28      BUN 3*   CREATININE 0.7   ALKPHOS 35*   ALT 10   AST 14   BILITOT 0.6       Radiology:  Imaging Results          CT Abdomen Pelvis With Contrast (Final result)  Result time 02/22/19 16:18:38    Final result by Darin Cunha DO (02/22/19 16:18:38)                 Impression:      Mild dilated thickened appendix with subtle periappendiceal fat stranding induration with small free fluid in the dependent pelvis concerning for acute appendicitis.  Clinical correlation and surgical consultation advised.    Mild hepatomegaly with slight decreased attenuation of the liver along the falciform ligament suggestive for focal fatty infiltration.    Please see above for additional details.      Electronically signed by: Darin Cunha DO  Date:    02/22/2019  Time:    16:18             Narrative:    EXAMINATION:  CT ABDOMEN PELVIS WITH CONTRAST    CLINICAL HISTORY:  RLQ pain, appendicitis suspected;Recent Pelvic US pt states nl;    TECHNIQUE:  Low dose axial images, sagittal and coronal reformations were obtained from the lung bases to the pubic symphysis following the IV administration of 75 mL of Omnipaque 350 .  Oral contrast was not given.    COMPARISON:  None.    FINDINGS:  No consolidation in the visualized lungs.  The liver is mildly enlarged measuring 18 cm in craniocaudal dimension.  There is slight decreased attenuation in the medial segment left lobe of the liver about the falciform ligament suggestive for focal fatty infiltration.  No calcified gallstones in the gallbladder by CT criteria.  The spleen and pancreas are normal in size without focal lesions.  The adrenal glands are within normal limits.    Kidneys are normal in size with uptake of contrast without hydronephrosis.  No abdominal aortic aneurysm.    There is mild dilatation of the appendix with subtle periappendiceal fat stranding induration with appendix measuring approximately 1 cm in diameter concerning for early appendicitis.   There is a small amount of free fluid in the pelvis.  No evidence for free intraperitoneal gas.    No focal dilated loop of bowel.  No evidence for acute fracture or subluxation visualized spine                                  Micro:  Microbiology Results (last 7 days)     ** No results found for the last 168 hours. **          Assessment and Plan: 18 year old female with acute appendicitis s/p lap appy 2/23    Tolerating a regular diet  Pain controlled  Home today    Dispo: continue inpatient care, s/p lap appy 2/23

## 2019-02-24 NOTE — PROGRESS NOTES
Patient AAOx4, VSS, Patient in NAD, Patient denies any pain. Patient advanced to regular diet, tolerating well, patient denies any nausea or vomiting.. Safety maintained, will continue to monitor. Preparing for discharge

## 2019-02-24 NOTE — ANESTHESIA POSTPROCEDURE EVALUATION
"Anesthesia Post Evaluation    Patient: Jonna Purcell    Procedure(s) Performed: Procedure(s) (LRB):  APPENDECTOMY, LAPAROSCOPIC (N/A)    Final Anesthesia Type: general  Patient location during evaluation: PACU  Patient participation: Yes- Able to Participate  Level of consciousness: awake and alert  Post-procedure vital signs: reviewed and stable  Pain management: adequate  Airway patency: patent  PONV status at discharge: No PONV  Anesthetic complications: no      Cardiovascular status: blood pressure returned to baseline and hemodynamically stable  Respiratory status: unassisted  Hydration status: euvolemic  Follow-up not needed.        Visit Vitals  BP (!) 113/58 (BP Location: Left arm, Patient Position: Lying)   Pulse 85   Temp 36.3 °C (97.4 °F) (Oral)   Resp 16   Ht 5' 2" (1.575 m)   Wt 56.2 kg (123 lb 14.4 oz)   LMP 12/30/2018 (Approximate)   SpO2 97%   Breastfeeding? No   BMI 22.66 kg/m²       Pain/Zohaib Score: Pain Rating Prior to Med Admin: 5 (2/23/2019  2:30 PM)  Pain Rating Post Med Admin: 0 (2/23/2019  2:50 PM)  Zohaib Score: 10 (2/23/2019  2:50 PM)        "

## 2019-02-24 NOTE — PLAN OF CARE
Discharge orders noted. Additional clinical references attached.    Patient's discharge instructions given by bedside RN and reviewed via this VN in Croatian.  Education provided on new medication, diagnosis, and follow-up appointments. Patient verbalized understanding. All questions answered. Patient requesting to ambulate off unit to car with father.

## 2019-02-24 NOTE — DISCHARGE SUMMARY
[facility]  General Surgery  Discharge Summary      Patient Name: Jonna uPrcell  MRN: [id number]  Admission Date: 2/22/2019  Hospital Length of Stay: 1 days  Discharge Date and Time:  02/24/2019 7:22 PM  Attending Physician: ABBY Briceno MD   Discharging Provider: Matthias Tony MD  Primary Care Provider: No primary care provider on file.     HPI: Patient is a 18 year old female with 2 days of abdominal pain. She started having nausea but no vomiting. Patient states she had a fever last night but otherwise felt okay. She denies any chills.   Complains of abdominal pain that localized to RLQ that has become progressively more localized to the RLQ. No history of previous surgery and no other medical conditions.         Procedure(s) (LRB):  APPENDECTOMY, LAPAROSCOPIC (N/A)     Hospital Course: Patient was admitted on 2/22 for acute appendiciits. Patient was admitted and received IV fluids and IV antibiotics and underwent a laparoscopic appendectomy on 2/23 with no issues. Patient spent the night and on 2/24 patient was tolerating a regular diet with no issues. She was voiding and her pain was controlled with pain medication. She was instructed to follow up with surgery clinic in 2-3 weeks.    Consults:  None    Significant Diagnostic Studies: Radiology: CT scan: CT ABDOMEN PELVIS WITH CONTRAST:   Results for orders placed or performed during the hospital encounter of 02/22/19   CT Abdomen Pelvis With Contrast    Narrative    EXAMINATION:  CT ABDOMEN PELVIS WITH CONTRAST    CLINICAL HISTORY:  RLQ pain, appendicitis suspected;Recent Pelvic US pt states nl;    TECHNIQUE:  Low dose axial images, sagittal and coronal reformations were obtained from the lung bases to the pubic symphysis following the IV administration of 75 mL of Omnipaque 350 .  Oral contrast was not given.    COMPARISON:  None.    FINDINGS:  No consolidation in the visualized lungs.  The liver is mildly enlarged  measuring 18 cm in craniocaudal dimension.  There is slight decreased attenuation in the medial segment left lobe of the liver about the falciform ligament suggestive for focal fatty infiltration.  No calcified gallstones in the gallbladder by CT criteria.  The spleen and pancreas are normal in size without focal lesions.  The adrenal glands are within normal limits.    Kidneys are normal in size with uptake of contrast without hydronephrosis.  No abdominal aortic aneurysm.    There is mild dilatation of the appendix with subtle periappendiceal fat stranding induration with appendix measuring approximately 1 cm in diameter concerning for early appendicitis.  There is a small amount of free fluid in the pelvis.  No evidence for free intraperitoneal gas.    No focal dilated loop of bowel.  No evidence for acute fracture or subluxation visualized spine      Impression    Mild dilated thickened appendix with subtle periappendiceal fat stranding induration with small free fluid in the dependent pelvis concerning for acute appendicitis.  Clinical correlation and surgical consultation advised.    Mild hepatomegaly with slight decreased attenuation of the liver along the falciform ligament suggestive for focal fatty infiltration.    Please see above for additional details.      Electronically signed by: Darin Cunha DO  Date:    02/22/2019  Time:    16:18       Pending Diagnostic Studies:     None        Final Active Diagnoses:      Problems Resolved During this Admission:    Diagnosis Date Noted Date Resolved POA    PRINCIPAL PROBLEM:  Acute appendicitis with localized peritonitis [K35.30] 02/22/2019 02/24/2019 Yes      Discharged Condition: good    Disposition: Home or Self Care    Follow Up:    Patient Instructions:      Diet Adult Regular     Notify your health care provider if you experience any of the following:  increased confusion or weakness     Notify your health care provider if you experience any of the  following:  persistent dizziness, light-headedness, or visual disturbances     Notify your health care provider if you experience any of the following:  worsening rash     Notify your health care provider if you experience any of the following:  severe persistent headache     Notify your health care provider if you experience any of the following:  difficulty breathing or increased cough     Notify your health care provider if you experience any of the following:  redness, tenderness, or signs of infection (pain, swelling, redness, odor or green/yellow discharge around incision site)     Notify your health care provider if you experience any of the following:  severe uncontrolled pain     Notify your health care provider if you experience any of the following:  persistent nausea and vomiting or diarrhea     Notify your health care provider if you experience any of the following:  temperature >100.4     Activity as tolerated     Medications:  Reconciled Home Medications:      Medication List      START taking these medications    traMADol 50 mg tablet  Commonly known as:  ULTRAM  Take 1 tablet (50 mg total) by mouth every 4 (four) hours as needed for Pain.        CONTINUE taking these medications    naproxen 250 MG tablet  Commonly known as:  NAPROSYN  Take 250 mg by mouth as needed.     norethindrone-ethinyl estradiol-iron 1-20(5)/1-30(7) /1mg-35mcg (9) Tab  Commonly known as:  ESTROSTEP FE  Take by mouth once daily.            Matthias Tony MD  General Surgery  [facility]

## 2019-02-24 NOTE — PLAN OF CARE
Discharge orders noted, no HH or HME ordered.    Pt's nurse will go over medications/signs and symptoms prior to discharge       02/24/19 1146   Final Note   Assessment Type Final Discharge Note   Anticipated Discharge Disposition Home   What phone number can be called within the next 1-3 days to see how you are doing after discharge? [phone]   Hospital Follow Up  Appt(s) scheduled? No  (Offices closed for weekend. Patient to schedule own follow up appointment.)   Right Care Referral Info   Post Acute Recommendation No Care     Delores Rosario, RN Transitional Navigator  [phone]

## 2019-04-02 ENCOUNTER — OFFICE VISIT (OUTPATIENT)
Dept: NEUROLOGY | Facility: HOSPITAL | Age: 19
End: 2019-04-02
Attending: SURGERY

## 2019-04-02 VITALS
TEMPERATURE: 99 F | BODY MASS INDEX: 22.92 KG/M2 | HEART RATE: 71 BPM | HEIGHT: 62 IN | WEIGHT: 124.56 LBS | SYSTOLIC BLOOD PRESSURE: 107 MMHG | DIASTOLIC BLOOD PRESSURE: 68 MMHG

## 2019-04-02 DIAGNOSIS — Z90.49 S/P APPENDECTOMY: ICD-10-CM

## 2019-04-02 PROCEDURE — 99213 OFFICE O/P EST LOW 20 MIN: CPT | Performed by: SURGERY

## 2021-11-15 NOTE — PLAN OF CARE
Left message for patient to return call. 30 day supply of levothyroxine sent to pharmacy on 11/09/21. Is her endocrinologist filling her medication? If not, we need a copy of her last thyroid labs. Can fax or send via My Chart.   Amanda DE RN     Problem: Adult Inpatient Plan of Care  Goal: Plan of Care Review  Outcome: Ongoing (interventions implemented as appropriate)  Patient AAOx4, VSS, Patient NPO all morning. Patient went to surgery today. Mid abdominal Incision by naval intact with dermabond. Patient complaining of pain after surgery, managed with IV dilaudid. Family at bedside. Safety maintained, will continue to monitor.      Problem: Pain Acute  Goal: Optimal Pain Control  Outcome: Ongoing (interventions implemented as appropriate)

## 2023-10-24 ENCOUNTER — OFFICE VISIT (OUTPATIENT)
Dept: PRIMARY CARE CLINIC | Facility: CLINIC | Age: 23
End: 2023-10-24
Payer: COMMERCIAL

## 2023-10-24 VITALS
DIASTOLIC BLOOD PRESSURE: 84 MMHG | OXYGEN SATURATION: 98 % | WEIGHT: 185.19 LBS | HEART RATE: 83 BPM | SYSTOLIC BLOOD PRESSURE: 122 MMHG | BODY MASS INDEX: 34.08 KG/M2 | HEIGHT: 62 IN

## 2023-10-24 DIAGNOSIS — R63.5 WEIGHT GAIN: ICD-10-CM

## 2023-10-24 DIAGNOSIS — Z00.00 PREVENTATIVE HEALTH CARE: Primary | ICD-10-CM

## 2023-10-24 DIAGNOSIS — E66.9 OBESITY (BMI 30-39.9): ICD-10-CM

## 2023-10-24 PROBLEM — Z90.49 S/P APPENDECTOMY: Status: RESOLVED | Noted: 2019-04-02 | Resolved: 2023-10-24

## 2023-10-24 PROCEDURE — 99385 PREV VISIT NEW AGE 18-39: CPT | Mod: S$GLB,,, | Performed by: INTERNAL MEDICINE

## 2023-10-24 PROCEDURE — 99999 PR PBB SHADOW E&M-EST. PATIENT-LVL IV: CPT | Mod: PBBFAC,,, | Performed by: INTERNAL MEDICINE

## 2023-10-24 PROCEDURE — 99999 PR PBB SHADOW E&M-EST. PATIENT-LVL IV: ICD-10-PCS | Mod: PBBFAC,,, | Performed by: INTERNAL MEDICINE

## 2023-10-24 PROCEDURE — 99385 PR PREVENTIVE VISIT,NEW,18-39: ICD-10-PCS | Mod: S$GLB,,, | Performed by: INTERNAL MEDICINE

## 2023-10-24 RX ORDER — TIRZEPATIDE 2.5 MG/.5ML
2.5 INJECTION, SOLUTION SUBCUTANEOUS
Qty: 1 PEN | Refills: 5 | Status: SHIPPED | OUTPATIENT
Start: 2023-10-24

## 2023-10-24 NOTE — PATIENT INSTRUCTIONS
Exercise 30 min 5 times per week, decrease portion sizes by 50%;  drink 6-8 glasses of water daily, avoid fast foods, creamy, rich foods carlos  ice cream, cheese creamy salad dressings;avoid eating 3 hours prior to bedtime; consider 8-10 hour intermittent diet; avoid simple sugars carlos white bread, rice, potatoes, noodles/pasta; No sweetened drinks.    Trial of Mounjaro 2.5mg SQ weekly     Check Bps at home weekly and prn symptoms for goal BP <130/80.  -Start healthy diet high in fiber (25-35 gm daily), low fat dairy, lean protein, low in saturated/trans fat (minimize cheese, creamy salad dressings); high in calcium/magnesium/potassium; 2.0 gm sodium diet; low in processed sugars and foods, ie  WHITE sugars, bread, pasta, rice, ice cream, cookies, cake, doughnuts  -Drink 6-8 glasses of water daily  -Moderate exercise 30 min 5 times per week or 75 min intense exercise biweekly   -Start 8-10 hour intermittent fasting diet   -Avoid eating 3 hours prior to bedtime  -Reduce alcohol to 1-2 glasses per day  -Minimize NSAIDs    Mediterranean Diet recommendations (Adopted from Simba et al, NEJ, 2018.)  -- Abundant use of Olive Oil for cooking and dressing dishes. Use herbs and spices instead to salt flavored foods.  -- Consumption of ?2 daily servings of vegetables (at least 1 of them as fresh vegetables in a salad), discounting side dishes.  -- ?2-3 daily servings of fresh fruits, ?3 weekly servings of legumes  -- ?3 weekly servings of fish or seafood (at least 1 serving of fatty fish).  Limit red meat and processed meats to no more than few times a month.  -- ?1 weekly serving of nuts or seeds  -- Limit consumption of cream, butter, margarine, processed foods, refined carbohydrates, sugary sodas and sweets     Get COVID/flu vaccines

## 2023-10-24 NOTE — ASSESSMENT & PLAN NOTE
.-Check Bps at home weekly and prn symptoms for goal BP <130/80.  -Start healthy diet high in fiber (25-35 gm daily), low fat dairy, lean protein, low in saturated/trans fat (minimize cheese, creamy salad dressings); high in calcium/magnesium/potassium; 2.0 gm sodium diet; low in processed sugars and foods, ie  WHITE sugars, bread, pasta, rice, ice cream, cookies, cake, doughnuts  -Drink 6-8 glasses of water daily  -Moderate exercise 30 min 5 times per week or 75 min intense exercise biweekly   -Start 8-10 hour intermittent fasting diet   -Avoid eating 3 hours prior to bedtime  -Reduce alcohol to 1-2 glasses per day  -Minimize NSAIDs    Mediterranean Diet recommendations (Adopted from Simba et al, NEJ, 2018.)  -- Abundant use of Olive Oil for cooking and dressing dishes. Use herbs and spices instead to salt flavored foods.  -- Consumption of ?2 daily servings of vegetables (at least 1 of them as fresh vegetables in a salad), discounting side dishes.  -- ?2-3 daily servings of fresh fruits, ?3 weekly servings of legumes  -- ?3 weekly servings of fish or seafood (at least 1 serving of fatty fish).  Limit red meat and processed meats to no more than few times a month.  -- ?1 weekly serving of nuts or seeds  -- Limit consumption of cream, butter, margarine, processed foods, refined carbohydrates, sugary sodas and sweets     Get COVID/flu vaccines

## 2023-10-24 NOTE — PROGRESS NOTES
[facility] Medicine[facility]Pediatric[facility] Progress Note      Chief Complaint     Thyroid Problem, Establish Care, and Annual Exam (Weight Gain and Wants to talk about possible Hypertension)       Subjective:      History of Present Illness:  Jonna Purcell is a 23 y.o. female here to establish care    Near Pre-eclampsia: 4/21/2022 Mandy  with persistently elevated Bps; was 120 lbs prior to pregnancy     Elevated BP today but does not check her BP at home  - started going to gym this month cardio/cycling - 3 times per week   -concerned about inability to lose weight since giving birth despite eating healthier diet  -gained 15 lbs over 2 wks     Obesity: would like to lose weight; started to exercise without improvement of weight and now eating healthier diet mom and sister are both overweight   -is seriously considering gastric sleeve   -her mom and sister are both overweight     FH:  mom HTN alive 52 y/o;  dad 52 y/o no issues  SH:  no tobacco, drugs; alcohol;  in June 2023 with daughter, Mandy, born 4/2022;  homemaker; [employer] works 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 6 years; 2 older and 1 younger      Past Medical History:  Past Medical History:   Diagnosis Date    S/P appendectomy 4/2/2019       Past Surgical History:  Past Surgical History:   Procedure Laterality Date    LAPAROSCOPIC APPENDECTOMY N/A 2/23/2019    Procedure: APPENDECTOMY, LAPAROSCOPIC;  Surgeon: ABBY Briceno MD;  Location: [facility];  Service: General;  Laterality: N/A;       Allergies:  Review of patient's allergies indicates:  No Known Allergies    Home Medications:  Current Outpatient Medications   Medication Sig Dispense Refill    tirzepatide (MOUNJARO) 2.5 mg/0.5 mL PnIj Inject 2.5 mg into the skin every 7 days. 1 pen 5     No current facility-administered medications for this visit.        Family History:  No family history on file.    Social History:  Social History     Tobacco Use    Smoking status: Never    Smokeless tobacco: Never  "  Substance Use Topics    Alcohol use: No    Drug use: No       Review of Systems:  Pertinent positives and negatives listed in HPI. All other systems are reviewed and are negative.    Health Maintaince :   The patient has no Health Maintenance topics of status Not Due        Eye: UTD  Dental: UTD    Immunizations:   Tdap: n/a.  Influenza: needs.  COVID: needs  Hepatitis C:   Cancer Screening:  PAP: UTD 2022 by dr. Jamie Darden     The ASCVD Risk score (David VYAS, et al., 2019) failed to calculate for the following reasons:    The 2019 ASCVD risk score is only valid for ages 40 to 79      Objective:   /84   Pulse 83   Ht 5' 2" (1.575 m)   Wt 84 kg (185 lb 3 oz)   LMP 10/20/2023   SpO2 98%   BMI 33.87 kg/m²      Body mass index is 33.87 kg/m².       Physical Examination:  General: Alert and awake in no apparent distress  HEENT: Normocephalic and atraumatic; Tms WNL  Eyes:  PERRL; EOMi with anicteric sclera and clear conjunctivae  Mouth:  Oropharynx clear and without exudate; moist mucous membranes  Neck:   Cervical nodes not enlarged; supple; no bruits  Cardio:  Regular rate and rhythm with normal S1 and S2; no murmurs or rubs  Resp:  CTAB; respirations unlabored; no wheezes, crackles or rhonchi  Abdom: Soft, NTND with normoactive bowel sounds; negative HSM  Extrem: Warm and well-perfused with no clubbing, cyanosis or edema  Skin:  No rashes, lesions, or color changes  Pulses:  2+ and symmetric distally  Neuro:  AAOx3; cooperative and pleasant with no focal deficits    Laboratory:      Most Recent Data:  Lab Results   Component Value Date    WBC 11.04 02/24/2019    HGB 12.3 02/24/2019    HCT 37.4 02/24/2019     02/24/2019    ALT 10 02/24/2019    AST 14 02/24/2019     02/24/2019    K 4.1 02/24/2019     02/24/2019    BUN 3 (L) 02/24/2019    CO2 28 02/24/2019              CBC:   WBC   Date Value Ref Range Status   02/24/2019 11.04 3.90 - 12.70 K/uL Final     Hemoglobin   Date Value Ref " "Range Status   02/24/2019 12.3 12.0 - 16.0 g/dL Final     Hematocrit   Date Value Ref Range Status   02/24/2019 37.4 37.0 - 48.5 % Final     Platelets   Date Value Ref Range Status   02/24/2019 270 150 - 350 K/uL Final     MCV   Date Value Ref Range Status   02/24/2019 89 82 - 98 fL Final     RDW   Date Value Ref Range Status   02/24/2019 12.3 11.5 - 14.5 % Final     BMP:   Sodium   Date Value Ref Range Status   02/24/2019 139 136 - 145 mmol/L Final     Potassium   Date Value Ref Range Status   02/24/2019 4.1 3.5 - 5.1 mmol/L Final     Chloride   Date Value Ref Range Status   02/24/2019 108 95 - 110 mmol/L Final     CO2   Date Value Ref Range Status   02/24/2019 28 23 - 29 mmol/L Final     BUN   Date Value Ref Range Status   02/24/2019 3 (L) 6 - 20 mg/dL Final     Creatinine   Date Value Ref Range Status   02/24/2019 0.7 0.5 - 1.4 mg/dL Final     Glucose   Date Value Ref Range Status   02/24/2019 130 (H) 70 - 110 mg/dL Final     Calcium   Date Value Ref Range Status   02/24/2019 9.0 8.7 - 10.5 mg/dL Final     LFTs:   Total Protein   Date Value Ref Range Status   02/24/2019 6.0 6.0 - 8.4 g/dL Final     Albumin   Date Value Ref Range Status   02/24/2019 3.2 (L) 3.5 - 5.2 g/dL Final     Total Bilirubin   Date Value Ref Range Status   02/24/2019 0.6 0.1 - 1.0 mg/dL Final     Comment:     For infants and newborns, interpretation of results should be based  on gestational age, weight and in agreement with clinical  observations.  Premature Infant recommended reference ranges:  Up to 24 hours.............<8.0 mg/dL  Up to 48 hours............<12.0 mg/dL  3-5 days..................<15.0 mg/dL  6-29 days.................<15.0 mg/dL       AST   Date Value Ref Range Status   02/24/2019 14 10 - 40 U/L Final     Alkaline Phosphatase   Date Value Ref Range Status   02/24/2019 35 (L) 55 - 135 U/L Final     ALT   Date Value Ref Range Status   02/24/2019 10 10 - 44 U/L Final     Coags: No results found for: "INR", "PROTIME", " ""PTT"  FLP:    No results found for: "CHOL"  No results found for: "HDL"  No results found for: "LDLCALC"  No results found for: "TRIG"  No results found for: "CHOLHDL"   DM:      Creatinine   Date Value Ref Range Status   02/24/2019 0.7 0.5 - 1.4 mg/dL Final     Thyroid: No results found for: "TSH", "FREET4", "X7IYLLY", "K8QKCAH", "THYROIDAB"  Anemia: No results found for: "IRON", "TIBC", "FERRITIN", "YXLGYJCX57", "FOLATE"  Cardiac: No results found for: "TROPONINI", "CKTOTAL", "CKMB", "BNP"  Urinalysis:   Color, UA   Date Value Ref Range Status   02/22/2019 Yellow Yellow, Straw, Joi Final     Specific Gravity, UA   Date Value Ref Range Status   02/22/2019 1.010 1.005 - 1.030 Final     Nitrite, UA   Date Value Ref Range Status   02/22/2019 Negative Negative Final     Ketones, UA   Date Value Ref Range Status   02/22/2019 Negative Negative Final     Urobilinogen, UA   Date Value Ref Range Status   02/22/2019 Negative <2.0 EU/dL Final     WBC, UA   Date Value Ref Range Status   02/22/2019 1 0 - 5 /hpf Final       Other Results:  EKG (my interpretation):     Radiology:  CT Abdomen Pelvis With Contrast  Narrative: EXAMINATION:  CT ABDOMEN PELVIS WITH CONTRAST    CLINICAL HISTORY:  RLQ pain, appendicitis suspected;Recent Pelvic US pt states nl;    TECHNIQUE:  Low dose axial images, sagittal and coronal reformations were obtained from the lung bases to the pubic symphysis following the IV administration of 75 mL of Omnipaque 350 .  Oral contrast was not given.    COMPARISON:  None.    FINDINGS:  No consolidation in the visualized lungs.  The liver is mildly enlarged measuring 18 cm in craniocaudal dimension.  There is slight decreased attenuation in the medial segment left lobe of the liver about the falciform ligament suggestive for focal fatty infiltration.  No calcified gallstones in the gallbladder by CT criteria.  The spleen and pancreas are normal in size without focal lesions.  The adrenal glands are within " normal limits.    Kidneys are normal in size with uptake of contrast without hydronephrosis.  No abdominal aortic aneurysm.    There is mild dilatation of the appendix with subtle periappendiceal fat stranding induration with appendix measuring approximately 1 cm in diameter concerning for early appendicitis.  There is a small amount of free fluid in the pelvis.  No evidence for free intraperitoneal gas.    No focal dilated loop of bowel.  No evidence for acute fracture or subluxation visualized spine  Impression: Mild dilated thickened appendix with subtle periappendiceal fat stranding induration with small free fluid in the dependent pelvis concerning for acute appendicitis.  Clinical correlation and surgical consultation advised.    Mild hepatomegaly with slight decreased attenuation of the liver along the falciform ligament suggestive for focal fatty infiltration.    Please see above for additional details.    Electronically signed by: Darin Cunha DO  Date:    02/22/2019  Time:    16:18          Assessment/Plan     Jonna Purcell is a 23 y.o. female with:  1. Preventative health care  Assessment & Plan:  .-Check Bps at home weekly and prn symptoms for goal BP <130/80.  -Start healthy diet high in fiber (25-35 gm daily), low fat dairy, lean protein, low in saturated/trans fat (minimize cheese, creamy salad dressings); high in calcium/magnesium/potassium; 2.0 gm sodium diet; low in processed sugars and foods, ie  WHITE sugars, bread, pasta, rice, ice cream, cookies, cake, doughnuts  -Drink 6-8 glasses of water daily  -Moderate exercise 30 min 5 times per week or 75 min intense exercise biweekly   -Start 8-10 hour intermittent fasting diet   -Avoid eating 3 hours prior to bedtime  -Reduce alcohol to 1-2 glasses per day  -Minimize NSAIDs    Mediterranean Diet recommendations (Adopted from Simba et al, NEJM, 2018.)  -- Abundant use of Olive Oil for cooking and dressing dishes. Use herbs and spices instead  to salt flavored foods.  -- Consumption of ?2 daily servings of vegetables (at least 1 of them as fresh vegetables in a salad), discounting side dishes.  -- ?2-3 daily servings of fresh fruits, ?3 weekly servings of legumes  -- ?3 weekly servings of fish or seafood (at least 1 serving of fatty fish).  Limit red meat and processed meats to no more than few times a month.  -- ?1 weekly serving of nuts or seeds  -- Limit consumption of cream, butter, margarine, processed foods, refined carbohydrates, sugary sodas and sweets     Get COVID/flu vaccines           Orders:  -     Lipid Panel; Future; Expected date: 10/24/2023  -     Hemoglobin A1C; Future; Expected date: 10/24/2023  -     Urinalysis; Future; Expected date: 10/24/2023  -     Comprehensive Metabolic Panel; Future; Expected date: 10/24/2023  -     CBC Auto Differential; Future; Expected date: 10/24/2023  -     Hepatitis C Antibody; Future; Expected date: 10/24/2023  -     HIV 1/2 Ag/Ab (4th Gen); Future; Expected date: 10/24/2023  -     Ambulatory referral/consult to Gynecology; Future; Expected date: 10/31/2023    2. Weight gain  Assessment & Plan:  Exercise 30 min 5 times per week, decrease portion sizes by 50%;  drink 6-8 glasses of water daily, avoid fast foods, creamy, rich foods carlos  ice cream, cheese creamy salad dressings;avoid eating 3 hours prior to bedtime; consider 8-10 hour intermittent diet; avoid simple sugars carlos white bread, rice, potatoes, noodles/pasta; No sweetened drinks.    Trial of Mounjaro 2.5mg SQ weekly     Orders:  -     TSH; Future; Expected date: 10/24/2023  -     T4, FREE; Future; Expected date: 10/24/2023  -     tirzepatide (MOUNJARO) 2.5 mg/0.5 mL PnIj; Inject 2.5 mg into the skin every 7 days.  Dispense: 1 pen ; Refill: 5    3. Obesity (BMI 30-39.9)  Assessment & Plan:  Trial of Mounjaro 2.5mg SQ weekly  Exercise 30 min 5 times per week, decrease portion sizes by 50%;  drink 6-8 glasses of water daily, avoid fast foods, creamy,  rich foods carlos  ice cream, cheese creamy salad dressings;avoid eating 3 hours prior to bedtime; consider 8-10 hour intermittent diet; avoid simple sugars carlos white bread, rice, potatoes, noodles/pasta; No sweetened drinks.    Orders:  -     tirzepatide (MOUNJARO) 2.5 mg/0.5 mL PnIj; Inject 2.5 mg into the skin every 7 days.  Dispense: 1 pen ; Refill: 5             I spent a total of 31 minutes on the day of the visit.This includes face to face time and non-face to face time preparing to see the patient (eg, review of tests), obtaining and/or reviewing separately obtained history, documenting clinical information in the electronic or other health record, independently interpreting results and communicating results to the patient/family/caregiver, or care coordinator.   Code Status:     Jolly Coy MD

## 2023-10-24 NOTE — ASSESSMENT & PLAN NOTE
Exercise 30 min 5 times per week, decrease portion sizes by 50%;  drink 6-8 glasses of water daily, avoid fast foods, creamy, rich foods carlos  ice cream, cheese creamy salad dressings;avoid eating 3 hours prior to bedtime; consider 8-10 hour intermittent diet; avoid simple sugars carlos white bread, rice, potatoes, noodles/pasta; No sweetened drinks.    Trial of Mounjaro 2.5mg SQ weekly

## 2023-10-25 ENCOUNTER — LAB VISIT (OUTPATIENT)
Dept: LAB | Facility: HOSPITAL | Age: 23
End: 2023-10-25
Attending: INTERNAL MEDICINE
Payer: COMMERCIAL

## 2023-10-25 DIAGNOSIS — Z00.00 PREVENTATIVE HEALTH CARE: ICD-10-CM

## 2023-10-25 DIAGNOSIS — R63.5 WEIGHT GAIN: ICD-10-CM

## 2023-10-25 LAB
ALBUMIN SERPL BCP-MCNC: 4.2 G/DL (ref 3.5–5.2)
ALP SERPL-CCNC: 80 U/L (ref 55–135)
ALT SERPL W/O P-5'-P-CCNC: 24 U/L (ref 10–44)
ANION GAP SERPL CALC-SCNC: 8 MMOL/L (ref 8–16)
AST SERPL-CCNC: 22 U/L (ref 10–40)
BASOPHILS # BLD AUTO: 0.05 K/UL (ref 0–0.2)
BASOPHILS NFR BLD: 0.6 % (ref 0–1.9)
BILIRUB SERPL-MCNC: 0.7 MG/DL (ref 0.1–1)
BUN SERPL-MCNC: 10 MG/DL (ref 6–20)
CALCIUM SERPL-MCNC: 9.4 MG/DL (ref 8.7–10.5)
CHLORIDE SERPL-SCNC: 107 MMOL/L (ref 95–110)
CHOLEST SERPL-MCNC: 139 MG/DL (ref 120–199)
CHOLEST/HDLC SERPL: 3.9 {RATIO} (ref 2–5)
CO2 SERPL-SCNC: 24 MMOL/L (ref 23–29)
CREAT SERPL-MCNC: 0.8 MG/DL (ref 0.5–1.4)
DIFFERENTIAL METHOD: NORMAL
EOSINOPHIL # BLD AUTO: 0.2 K/UL (ref 0–0.5)
EOSINOPHIL NFR BLD: 1.8 % (ref 0–8)
ERYTHROCYTE [DISTWIDTH] IN BLOOD BY AUTOMATED COUNT: 12.3 % (ref 11.5–14.5)
EST. GFR  (NO RACE VARIABLE): >60 ML/MIN/1.73 M^2
ESTIMATED AVG GLUCOSE: 103 MG/DL (ref 68–131)
GLUCOSE SERPL-MCNC: 85 MG/DL (ref 70–110)
HBA1C MFR BLD: 5.2 % (ref 4–5.6)
HCT VFR BLD AUTO: 44.1 % (ref 37–48.5)
HCV AB SERPL QL IA: NORMAL
HDLC SERPL-MCNC: 36 MG/DL (ref 40–75)
HDLC SERPL: 25.9 % (ref 20–50)
HGB BLD-MCNC: 14.8 G/DL (ref 12–16)
HIV 1+2 AB+HIV1 P24 AG SERPL QL IA: NORMAL
IMM GRANULOCYTES # BLD AUTO: 0.03 K/UL (ref 0–0.04)
IMM GRANULOCYTES NFR BLD AUTO: 0.3 % (ref 0–0.5)
LDLC SERPL CALC-MCNC: 77.2 MG/DL (ref 63–159)
LYMPHOCYTES # BLD AUTO: 2.7 K/UL (ref 1–4.8)
LYMPHOCYTES NFR BLD: 30.4 % (ref 18–48)
MCH RBC QN AUTO: 29 PG (ref 27–31)
MCHC RBC AUTO-ENTMCNC: 33.6 G/DL (ref 32–36)
MCV RBC AUTO: 87 FL (ref 82–98)
MONOCYTES # BLD AUTO: 0.7 K/UL (ref 0.3–1)
MONOCYTES NFR BLD: 7.3 % (ref 4–15)
NEUTROPHILS # BLD AUTO: 5.3 K/UL (ref 1.8–7.7)
NEUTROPHILS NFR BLD: 59.6 % (ref 38–73)
NONHDLC SERPL-MCNC: 103 MG/DL
NRBC BLD-RTO: 0 /100 WBC
PLATELET # BLD AUTO: 383 K/UL (ref 150–450)
PMV BLD AUTO: 10.1 FL (ref 9.2–12.9)
POTASSIUM SERPL-SCNC: 4 MMOL/L (ref 3.5–5.1)
PROT SERPL-MCNC: 7.3 G/DL (ref 6–8.4)
RBC # BLD AUTO: 5.1 M/UL (ref 4–5.4)
SODIUM SERPL-SCNC: 139 MMOL/L (ref 136–145)
T4 FREE SERPL-MCNC: 0.81 NG/DL (ref 0.71–1.51)
TRIGL SERPL-MCNC: 129 MG/DL (ref 30–150)
TSH SERPL DL<=0.005 MIU/L-ACNC: 2.51 UIU/ML (ref 0.4–4)
WBC # BLD AUTO: 8.91 K/UL (ref 3.9–12.7)

## 2023-10-25 PROCEDURE — 84439 ASSAY OF FREE THYROXINE: CPT | Performed by: INTERNAL MEDICINE

## 2023-10-25 PROCEDURE — 84443 ASSAY THYROID STIM HORMONE: CPT | Performed by: INTERNAL MEDICINE

## 2023-10-25 PROCEDURE — 36415 COLL VENOUS BLD VENIPUNCTURE: CPT | Mod: PN | Performed by: INTERNAL MEDICINE

## 2023-10-25 PROCEDURE — 80061 LIPID PANEL: CPT | Performed by: INTERNAL MEDICINE

## 2023-10-25 PROCEDURE — 86803 HEPATITIS C AB TEST: CPT | Performed by: INTERNAL MEDICINE

## 2023-10-25 PROCEDURE — 87389 HIV-1 AG W/HIV-1&-2 AB AG IA: CPT | Performed by: INTERNAL MEDICINE

## 2023-10-25 PROCEDURE — 80053 COMPREHEN METABOLIC PANEL: CPT | Performed by: INTERNAL MEDICINE

## 2023-10-25 PROCEDURE — 85025 COMPLETE CBC W/AUTO DIFF WBC: CPT | Performed by: INTERNAL MEDICINE

## 2023-10-25 PROCEDURE — 83036 HEMOGLOBIN GLYCOSYLATED A1C: CPT | Performed by: INTERNAL MEDICINE

## 2023-10-25 NOTE — ASSESSMENT & PLAN NOTE
Trial of Mounjaro 2.5mg SQ weekly  Exercise 30 min 5 times per week, decrease portion sizes by 50%;  drink 6-8 glasses of water daily, avoid fast foods, creamy, rich foods carlos  ice cream, cheese creamy salad dressings;avoid eating 3 hours prior to bedtime; consider 8-10 hour intermittent diet; avoid simple sugars carlos white bread, rice, potatoes, noodles/pasta; No sweetened drinks.

## 2023-11-15 ENCOUNTER — PATIENT MESSAGE (OUTPATIENT)
Dept: ADMINISTRATIVE | Facility: HOSPITAL | Age: 23
End: 2023-11-15
Payer: COMMERCIAL

## 2023-12-12 ENCOUNTER — OFFICE VISIT (OUTPATIENT)
Dept: OBSTETRICS AND GYNECOLOGY | Facility: CLINIC | Age: 23
End: 2023-12-12
Payer: COMMERCIAL

## 2023-12-12 VITALS
HEIGHT: 62 IN | BODY MASS INDEX: 31.52 KG/M2 | WEIGHT: 171.31 LBS | SYSTOLIC BLOOD PRESSURE: 118 MMHG | DIASTOLIC BLOOD PRESSURE: 82 MMHG

## 2023-12-12 DIAGNOSIS — N92.6 IRREGULAR MENSTRUAL CYCLE: ICD-10-CM

## 2023-12-12 DIAGNOSIS — Z01.419 WOMEN'S ANNUAL ROUTINE GYNECOLOGICAL EXAMINATION: Primary | ICD-10-CM

## 2023-12-12 DIAGNOSIS — Z12.4 ENCOUNTER FOR PAPANICOLAOU SMEAR FOR CERVICAL CANCER SCREENING: ICD-10-CM

## 2023-12-12 DIAGNOSIS — L68.9 EXCESSIVE HAIR GROWTH: ICD-10-CM

## 2023-12-12 DIAGNOSIS — Z00.00 PREVENTATIVE HEALTH CARE: ICD-10-CM

## 2023-12-12 DIAGNOSIS — N89.8 VAGINAL ODOR: ICD-10-CM

## 2023-12-12 PROCEDURE — 99999 PR PBB SHADOW E&M-EST. PATIENT-LVL III: CPT | Mod: PBBFAC,,, | Performed by: NURSE PRACTITIONER

## 2023-12-12 PROCEDURE — 99385 PREV VISIT NEW AGE 18-39: CPT | Mod: S$GLB,,, | Performed by: NURSE PRACTITIONER

## 2023-12-12 PROCEDURE — 99999 PR PBB SHADOW E&M-EST. PATIENT-LVL III: ICD-10-PCS | Mod: PBBFAC,,, | Performed by: NURSE PRACTITIONER

## 2023-12-12 PROCEDURE — 99385 PR PREVENTIVE VISIT,NEW,18-39: ICD-10-PCS | Mod: S$GLB,,, | Performed by: NURSE PRACTITIONER

## 2023-12-12 PROCEDURE — 88175 CYTOPATH C/V AUTO FLUID REDO: CPT | Performed by: NURSE PRACTITIONER

## 2023-12-12 RX ORDER — SPIRONOLACTONE 50 MG/1
50 TABLET, FILM COATED ORAL DAILY
Qty: 30 TABLET | Refills: 5 | Status: SHIPPED | OUTPATIENT
Start: 2023-12-12 | End: 2024-12-11

## 2023-12-12 RX ORDER — ONDANSETRON 4 MG/1
4 TABLET, ORALLY DISINTEGRATING ORAL EVERY 8 HOURS PRN
COMMUNITY
Start: 2023-12-03

## 2023-12-12 RX ORDER — METRONIDAZOLE 7.5 MG/G
1 GEL VAGINAL DAILY
Qty: 70 G | Refills: 0 | Status: SHIPPED | OUTPATIENT
Start: 2023-12-12 | End: 2023-12-17

## 2023-12-12 NOTE — PROGRESS NOTES
CC: Annual  HPI: Pt is a 23 y.o.  female who presents for routine annual exam. She is from [place]. 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 She uses vasectomy for contraception. She does not want STD screening.  Reports irregular cycles and abnormal facial hair growth. She reports issues with weight loss- started injectable weight loss meds.  She reports vaginal odor. She denies DC or itching. She reports urinary urgency since childbirth. Reports occasional discomfort with intercourse.   The patient participates in regular exercise: needs.  The patient does not smoke.   Pt denies any domestic violence.     FH:  Breast cancer: none  Colon cancer: none  Ovarian cancer: none  Endometrial cancer: none    ROS:  GENERAL: Feeling well overall. Denies fever or chills.   SKIN: Denies rash or lesions.   HEAD: Denies head injury or headache.   NODES: Denies enlarged lymph nodes.   CHEST: Denies chest pain or shortness of breath.   CARDIOVASCULAR: Denies palpitations or left sided chest pain.   ABDOMEN: No abdominal pain, constipation, diarrhea, nausea, vomiting or rectal bleeding.   URINARY: No dysuria, hematuria, or burning on urination.  REPRODUCTIVE: See HPI.   BREASTS: Denies pain, lumps, or nipple discharge.   HEMATOLOGIC: No easy bruisability or excessive bleeding.   MUSCULOSKELETAL: Denies joint pain or swelling.   NEUROLOGIC: Denies syncope or weakness.   PSYCHIATRIC: Denies depression, anxiety or mood swings.    PE:   APPEARANCE: Well nourished, well developed, White female in no acute distress.  NODES: no cervical, supraclavicular, or inguinal lymphadenopathy  BREASTS: Symmetrical, no skin changes or visible lesions. No palpable masses, nipple discharge or adenopathy bilaterally.  ABDOMEN: Soft. No tenderness or masses. No distention. No hernias palpated. No CVA tenderness.  VULVA: No lesions. Normal external female genitalia.  URETHRAL MEATUS: Normal size and location, no lesions, no prolapse.  URETHRA: No masses,  tenderness, or prolapse.  VAGINA: Moist. No lesions or lacerations noted. No abnormal discharge present. No odor present.   CERVIX: No lesions or discharge. No cervical motion tenderness.   UTERUS: Normal size, regular shape, mobile, non-tender.  ADNEXA: No tenderness. No fullness or masses palpated in the adnexal regions.   ANUS PERINEUM: Normal.      Diagnosis:  1. Women's annual routine gynecological examination    2. Preventative health care    3. Irregular menstrual cycle    4. Encounter for Papanicolaou smear for cervical cancer screening    5. Vaginal odor    6. Excessive hair growth        Plan:     Orders Placed This Encounter    Liquid-Based Pap Smear, Screening    metroNIDAZOLE (METROGEL VAGINAL) 0.75 % (37.5mg/5 gram) vaginal gel    spironolactone (ALDACTONE) 50 MG tablet         Plan:   Pap  Discussed possible PCOS and importance to have a cycle at least q 3 months to reduce risk of endometrial hyperplasia. Discussed potential for insulin resistance.  Pt to see PCP annually for DM screenings.   Discussed hair growth options - waxing/ electrolysis/ Sprinolactone   CBC, CMP 10/23 WNL- will start Spironolactone 50 mg daily   Metrogel for suspected BV  Discussed urinary urgency- timed voiding, decreasing bladder irritants, and kegals   Can also refer to pelvic floor PT - declines at preset   Discussed water based lubricant prior to intercourse       Orders Placed This Encounter    Liquid-Based Pap Smear, Screening       Patient was counseled today on the new ACS guidelines for cervical cytology screening as well as the current recommendations for breast cancer screening. She was counseled to follow up with her PCP for other routine health maintenance. Counseling session lasted approximately 10 minutes, and all her questions were answered.    Follow-up with me in 1 year for routine exam    MELISSA Cheek

## 2023-12-26 ENCOUNTER — OFFICE VISIT (OUTPATIENT)
Dept: PRIMARY CARE CLINIC | Facility: CLINIC | Age: 23
End: 2023-12-26
Payer: COMMERCIAL

## 2023-12-26 DIAGNOSIS — E66.9 OBESITY (BMI 30-39.9): ICD-10-CM

## 2023-12-26 DIAGNOSIS — R63.5 WEIGHT GAIN: ICD-10-CM

## 2023-12-26 PROCEDURE — 99213 PR OFFICE/OUTPT VISIT, EST, LEVL III, 20-29 MIN: ICD-10-PCS | Mod: 95,,, | Performed by: INTERNAL MEDICINE

## 2023-12-26 PROCEDURE — 99213 OFFICE O/P EST LOW 20 MIN: CPT | Mod: 95,,, | Performed by: INTERNAL MEDICINE

## 2023-12-26 RX ORDER — TIRZEPATIDE 5 MG/.5ML
5 INJECTION, SOLUTION SUBCUTANEOUS
Qty: 1 PEN | Refills: 5 | Status: SHIPPED | OUTPATIENT
Start: 2023-12-26

## 2023-12-26 NOTE — PROGRESS NOTES
[facility] Progress Note        Audiovisual Visit  Location:  [place], [place]      Chief Complaint     No chief complaint on file.   for weight gain    Subjective:      History of Present Illness:  Jnona Purcell is a 23 y.o. female     Has lost down 167 lbs down from 185 lbs since 10/2023 now eating smaller portions but desires higher dose since she is starting to feel hungrier; exercising cardio 1 hour 4 times per week .  -denies GI side effects     Past Medical History:  Past Medical History:   Diagnosis Date    S/P appendectomy 04/02/2019    Weight gain 10/24/2023       Past Surgical History:  Past Surgical History:   Procedure Laterality Date    LAPAROSCOPIC APPENDECTOMY N/A 2/23/2019    Procedure: APPENDECTOMY, LAPAROSCOPIC;  Surgeon: ABBY Briceno MD;  Location: [facility] OR;  Service: General;  Laterality: N/A;       Allergies:  Review of patient's allergies indicates:  No Known Allergies    Home Medications:  Current Outpatient Medications   Medication Sig Dispense Refill    ondansetron (ZOFRAN-ODT) 4 MG TbDL Take 4 mg by mouth every 8 (eight) hours as needed.      spironolactone (ALDACTONE) 50 MG tablet Take 1 tablet (50 mg total) by mouth once daily. 30 tablet 5    tirzepatide (MOUNJARO) 2.5 mg/0.5 mL PnIj Inject 2.5 mg into the skin every 7 days. 1 pen 5    tirzepatide (MOUNJARO) 5 mg/0.5 mL PnIj Inject 5 mg into the skin every 7 days. 1 Pen 5     No current facility-administered medications for this visit.        Family History:  No family history on file.    Social History:  Social History     Tobacco Use    Smoking status: Never    Smokeless tobacco: Never   Substance Use Topics    Alcohol use: No    Drug use: No       Review of Systems:  Pertinent positives and negatives listed in HPI. All other systems are reviewed and are negative.    Health Maintaince :   Health Maintenance Topics with due status: Not Due       Topic Last Completion Date    TETANUS VACCINE  05/07/2018           Eye:   Dental:     Immunizations:     The ASCVD Risk score (David VYAS, et al., 2019) failed to calculate for the following reasons:    The 2019 ASCVD risk score is only valid for ages 40 to 79      Objective:   LMP  (LMP Unknown)      There is no height or weight on file to calculate BMI.       Physical Examination:  General: Alert and awake in no apparent distress  Neuro:  AAOx3; cooperative and pleasant    Laboratory:      Most Recent Data:  Lab Results   Component Value Date    WBC 8.91 10/25/2023    HGB 14.8 10/25/2023    HCT 44.1 10/25/2023     10/25/2023    CHOL 139 10/25/2023    TRIG 129 10/25/2023    HDL 36 (L) 10/25/2023    ALT 24 10/25/2023    AST 22 10/25/2023     10/25/2023    K 4.0 10/25/2023     10/25/2023    BUN 10 10/25/2023    CO2 24 10/25/2023    TSH 2.509 10/25/2023    HGBA1C 5.2 10/25/2023              CBC:   WBC   Date Value Ref Range Status   10/25/2023 8.91 3.90 - 12.70 K/uL Final     Hemoglobin   Date Value Ref Range Status   10/25/2023 14.8 12.0 - 16.0 g/dL Final     Hematocrit   Date Value Ref Range Status   10/25/2023 44.1 37.0 - 48.5 % Final     Platelets   Date Value Ref Range Status   10/25/2023 383 150 - 450 K/uL Final     MCV   Date Value Ref Range Status   10/25/2023 87 82 - 98 fL Final     RDW   Date Value Ref Range Status   10/25/2023 12.3 11.5 - 14.5 % Final     BMP:   Sodium   Date Value Ref Range Status   10/25/2023 139 136 - 145 mmol/L Final     Potassium   Date Value Ref Range Status   10/25/2023 4.0 3.5 - 5.1 mmol/L Final     Chloride   Date Value Ref Range Status   10/25/2023 107 95 - 110 mmol/L Final     CO2   Date Value Ref Range Status   10/25/2023 24 23 - 29 mmol/L Final     BUN   Date Value Ref Range Status   10/25/2023 10 6 - 20 mg/dL Final     Creatinine   Date Value Ref Range Status   10/25/2023 0.8 0.5 - 1.4 mg/dL Final     Glucose   Date Value Ref Range Status   10/25/2023 85 70 - 110 mg/dL Final     Calcium   Date Value Ref  "Range Status   10/25/2023 9.4 8.7 - 10.5 mg/dL Final     LFTs:   Total Protein   Date Value Ref Range Status   10/25/2023 7.3 6.0 - 8.4 g/dL Final     Albumin   Date Value Ref Range Status   10/25/2023 4.2 3.5 - 5.2 g/dL Final     Total Bilirubin   Date Value Ref Range Status   10/25/2023 0.7 0.1 - 1.0 mg/dL Final     Comment:     For infants and newborns, interpretation of results should be based  on gestational age, weight and in agreement with clinical  observations.    Premature Infant recommended reference ranges:  Up to 24 hours.............<8.0 mg/dL  Up to 48 hours............<12.0 mg/dL  3-5 days..................<15.0 mg/dL  6-29 days.................<15.0 mg/dL       AST   Date Value Ref Range Status   10/25/2023 22 10 - 40 U/L Final     Alkaline Phosphatase   Date Value Ref Range Status   10/25/2023 80 55 - 135 U/L Final     ALT   Date Value Ref Range Status   10/25/2023 24 10 - 44 U/L Final     Coags: No results found for: "INR", "PROTIME", "PTT"  FLP:      Lab Results   Component Value Date    CHOL 139 10/25/2023     Lab Results   Component Value Date    HDL 36 (L) 10/25/2023     Lab Results   Component Value Date    LDLCALC 77.2 10/25/2023     Lab Results   Component Value Date    TRIG 129 10/25/2023     Lab Results   Component Value Date    CHOLHDL 25.9 10/25/2023      DM:      Hemoglobin A1C   Date Value Ref Range Status   10/25/2023 5.2 4.0 - 5.6 % Final     Comment:     ADA Screening Guidelines:  5.7-6.4%  Consistent with prediabetes  >or=6.5%  Consistent with diabetes    High levels of fetal hemoglobin interfere with the HbA1C  assay. Heterozygous hemoglobin variants (HbS, HgC, etc)do  not significantly interfere with this assay.   However, presence of multiple variants may affect accuracy.       LDL Cholesterol   Date Value Ref Range Status   10/25/2023 77.2 63.0 - 159.0 mg/dL Final     Comment:     The National Cholesterol Education Program (NCEP) has set the  following guidelines (reference " "values) for LDL Cholesterol:  Optimal.......................<130 mg/dL  Borderline High...............130-159 mg/dL  High..........................160-189 mg/dL  Very High.....................>190 mg/dL       Creatinine   Date Value Ref Range Status   10/25/2023 0.8 0.5 - 1.4 mg/dL Final     Thyroid:   TSH   Date Value Ref Range Status   10/25/2023 2.509 0.400 - 4.000 uIU/mL Final     Free T4   Date Value Ref Range Status   10/25/2023 0.81 0.71 - 1.51 ng/dL Final     Anemia: No results found for: "IRON", "TIBC", "FERRITIN", "[id number]", "FOLATE"  Cardiac: No results found for: "TROPONINI", "CKTOTAL", "CKMB", "BNP"  Urinalysis:   Color, UA   Date Value Ref Range Status   10/25/2023 Yellow Yellow, Straw, Joi Final     Specific Gravity, UA   Date Value Ref Range Status   10/25/2023 1.005 1.005 - 1.030 Final     Nitrite, UA   Date Value Ref Range Status   10/25/2023 Negative Negative Final     Ketones, UA   Date Value Ref Range Status   10/25/2023 Negative Negative Final     Urobilinogen, UA   Date Value Ref Range Status   02/22/2019 Negative <2.0 EU/dL Final     WBC, UA   Date Value Ref Range Status   10/25/2023 1 0 - 5 /hpf Final       Other Results:  EKG (my interpretation):     Radiology:  CT Abdomen Pelvis With Contrast  Narrative: EXAMINATION:  CT ABDOMEN PELVIS WITH CONTRAST    CLINICAL HISTORY:  RLQ pain, appendicitis suspected;Recent Pelvic US pt states nl;    TECHNIQUE:  Low dose axial images, sagittal and coronal reformations were obtained from the lung bases to the pubic symphysis following the IV administration of 75 mL of Omnipaque 350 .  Oral contrast was not given.    COMPARISON:  None.    FINDINGS:  No consolidation in the visualized lungs.  The liver is mildly enlarged measuring 18 cm in craniocaudal dimension.  There is slight decreased attenuation in the medial segment left lobe of the liver about the falciform ligament suggestive for focal fatty infiltration.  No calcified gallstones in the " gallbladder by CT criteria.  The spleen and pancreas are normal in size without focal lesions.  The adrenal glands are within normal limits.    Kidneys are normal in size with uptake of contrast without hydronephrosis.  No abdominal aortic aneurysm.    There is mild dilatation of the appendix with subtle periappendiceal fat stranding induration with appendix measuring approximately 1 cm in diameter concerning for early appendicitis.  There is a small amount of free fluid in the pelvis.  No evidence for free intraperitoneal gas.    No focal dilated loop of bowel.  No evidence for acute fracture or subluxation visualized spine  Impression: Mild dilated thickened appendix with subtle periappendiceal fat stranding induration with small free fluid in the dependent pelvis concerning for acute appendicitis.  Clinical correlation and surgical consultation advised.    Mild hepatomegaly with slight decreased attenuation of the liver along the falciform ligament suggestive for focal fatty infiltration.    Please see above for additional details.    Electronically signed by: Darin Cunha DO  Date:    02/22/2019  Time:    16:18          Assessment/Plan     Jonna Purcell is a 23 y.o. female with:  1. Weight gain    2. Obesity (BMI 30-39.9)  Assessment & Plan:  Increase Mounjaro 0.5mg SQ weekly but monitor closely for GI side effects   Virtual visit in 4 wks   Exercise 30 min 5 times per week, decrease portion sizes by 50%; encourage plant-based diet;  drink 6-8 glasses of water daily, avoid fast foods, creamy, rich foods carlos ice cream, cheese creamy salad dressings;avoid eating 3 hours prior to bedtime; consider 8-10 hour intermittent diet; avoid simple sugars carlos white bread, rice, potatoes, noodles/pasta; No sweetened drinks.     Orders:  -     tirzepatide (MOUNJARO) 5 mg/0.5 mL PnIj; Inject 5 mg into the skin every 7 days.  Dispense: 1 Pen; Refill: 5             I spent 6 min with this pt that includes face to face  time and non-face to face time preparing to see the patient (eg, review of tests), obtaining and/or reviewing separately obtained history, documenting clinical information in the electronic or other health record, independently interpreting results and communicating results to the patient/family/caregiver, or care coordinator.   Code Status:     Jolly Coy MD

## 2023-12-26 NOTE — ASSESSMENT & PLAN NOTE
Increase Mounjaro 0.5mg SQ weekly but monitor closely for GI side effects   Virtual visit in 4 wks   Exercise 30 min 5 times per week, decrease portion sizes by 50%; encourage plant-based diet;  drink 6-8 glasses of water daily, avoid fast foods, creamy, rich foods carlos ice cream, cheese creamy salad dressings;avoid eating 3 hours prior to bedtime; consider 8-10 hour intermittent diet; avoid simple sugars carlos white bread, rice, potatoes, noodles/pasta; No sweetened drinks.

## 2023-12-29 LAB
FINAL PATHOLOGIC DIAGNOSIS: NORMAL
Lab: NORMAL

## 2024-01-29 PROBLEM — Z00.00 PREVENTATIVE HEALTH CARE: Status: RESOLVED | Noted: 2023-10-24 | Resolved: 2024-01-29

## (undated) DEVICE — DRAPE INCISE IOBAN 2 23X23IN

## (undated) DEVICE — SOL IRR NACL .9% 3000ML

## (undated) DEVICE — GLOVE PROTEXIS LTX MICRO  7.5

## (undated) DEVICE — IRRIGATOR ENDOSCOPY DISP.

## (undated) DEVICE — SEE MEDLINE ITEM 156952

## (undated) DEVICE — SYS CLSR DERMABOND PRINEO 22CM

## (undated) DEVICE — DISSECTOR 5MM ENDOPATH

## (undated) DEVICE — TROCAR ENDOPATH XCEL 12X100MM

## (undated) DEVICE — MANIFOLD 4 PORT

## (undated) DEVICE — SUT 0 VICRYL / UR6 (J603)

## (undated) DEVICE — STAPLER INT LINEAR ARTC 3.5-45

## (undated) DEVICE — ADHESIVE DERMABOND ADVANCED

## (undated) DEVICE — ELECTRODE REM PLYHSV RETURN 9

## (undated) DEVICE — SUT MCRYL PLUS 4-0 PS2 27IN

## (undated) DEVICE — APPLIER CLIP ENDO MED/LG 10MM

## (undated) DEVICE — TRAY FOLEY 16FR INFECTION CONT

## (undated) DEVICE — NDL HYPDRM 23X15

## (undated) DEVICE — TROCAR ENDOPATH EXCEL